# Patient Record
Sex: FEMALE | Race: WHITE | Employment: OTHER | URBAN - METROPOLITAN AREA
[De-identification: names, ages, dates, MRNs, and addresses within clinical notes are randomized per-mention and may not be internally consistent; named-entity substitution may affect disease eponyms.]

---

## 2017-05-11 ENCOUNTER — OFFICE VISIT (OUTPATIENT)
Dept: OBGYN CLINIC | Age: 32
End: 2017-05-11
Payer: COMMERCIAL

## 2017-05-11 VITALS
WEIGHT: 119.6 LBS | HEIGHT: 62 IN | BODY MASS INDEX: 22.01 KG/M2 | DIASTOLIC BLOOD PRESSURE: 58 MMHG | SYSTOLIC BLOOD PRESSURE: 111 MMHG | HEART RATE: 75 BPM

## 2017-05-11 DIAGNOSIS — N92.6 MISSED MENSES: Primary | ICD-10-CM

## 2017-05-11 LAB
CONTROL: POSITIVE
PREGNANCY TEST URINE, POC: POSITIVE

## 2017-05-11 PROCEDURE — G8420 CALC BMI NORM PARAMETERS: HCPCS | Performed by: ADVANCED PRACTICE MIDWIFE

## 2017-05-11 PROCEDURE — 99202 OFFICE O/P NEW SF 15 MIN: CPT | Performed by: ADVANCED PRACTICE MIDWIFE

## 2017-05-11 PROCEDURE — 1036F TOBACCO NON-USER: CPT | Performed by: ADVANCED PRACTICE MIDWIFE

## 2017-05-11 PROCEDURE — 81025 URINE PREGNANCY TEST: CPT | Performed by: ADVANCED PRACTICE MIDWIFE

## 2017-05-11 PROCEDURE — G8427 DOCREV CUR MEDS BY ELIG CLIN: HCPCS | Performed by: ADVANCED PRACTICE MIDWIFE

## 2017-05-16 ENCOUNTER — HOSPITAL ENCOUNTER (OUTPATIENT)
Dept: ULTRASOUND IMAGING | Age: 32
Discharge: HOME | End: 2017-05-16
Payer: COMMERCIAL

## 2017-05-16 DIAGNOSIS — N92.6 MISSED MENSES: ICD-10-CM

## 2017-05-16 PROCEDURE — 76801 OB US < 14 WKS SINGLE FETUS: CPT

## 2017-06-08 ENCOUNTER — INITIAL PRENATAL (OUTPATIENT)
Dept: OBGYN CLINIC | Age: 32
End: 2017-06-08

## 2017-06-08 ENCOUNTER — HOSPITAL ENCOUNTER (OUTPATIENT)
Age: 32
Setting detail: SPECIMEN
Discharge: HOME | End: 2017-06-08
Payer: COMMERCIAL

## 2017-06-08 VITALS
WEIGHT: 120 LBS | HEART RATE: 67 BPM | SYSTOLIC BLOOD PRESSURE: 105 MMHG | BODY MASS INDEX: 22.31 KG/M2 | DIASTOLIC BLOOD PRESSURE: 64 MMHG

## 2017-06-08 DIAGNOSIS — Z34.91 NORMAL PREGNANCY IN FIRST TRIMESTER: ICD-10-CM

## 2017-06-08 DIAGNOSIS — O22.41 HEMORRHOIDS DURING PREGNANCY IN FIRST TRIMESTER: ICD-10-CM

## 2017-06-08 DIAGNOSIS — F41.9 ANXIETY: ICD-10-CM

## 2017-06-08 LAB
-: ABNORMAL
ABO/RH: NORMAL
ABSOLUTE EOS #: 0.3 K/UL (ref 0–0.4)
ABSOLUTE LYMPH #: 1.2 K/UL (ref 1–4.8)
ABSOLUTE MONO #: 0.5 K/UL (ref 0.1–1.2)
AMORPHOUS: ABNORMAL
AMPHETAMINE SCREEN URINE: NEGATIVE
ANTIBODY SCREEN: NEGATIVE
BACTERIA: ABNORMAL
BARBITURATE SCREEN URINE: NEGATIVE
BASOPHILS # BLD: 0 %
BASOPHILS ABSOLUTE: 0 K/UL (ref 0–0.2)
BENZODIAZEPINE SCREEN, URINE: NEGATIVE
BILIRUBIN URINE: NEGATIVE
BUPRENORPHINE URINE: NORMAL
CANNABINOID SCREEN URINE: NEGATIVE
CASTS UA: ABNORMAL /LPF (ref 0–2)
COCAINE METABOLITE, URINE: NEGATIVE
COLOR: ABNORMAL
COMMENT UA: ABNORMAL
CRYSTALS, UA: ABNORMAL /HPF
DIFFERENTIAL TYPE: ABNORMAL
EOSINOPHILS RELATIVE PERCENT: 3 %
EPITHELIAL CELLS UA: ABNORMAL /HPF (ref 0–5)
GLUCOSE URINE: NEGATIVE
HCT VFR BLD CALC: 35.8 % (ref 36–46)
HEMOGLOBIN: 12.2 G/DL (ref 12–16)
HEPATITIS B SURFACE ANTIGEN: NONREACTIVE
HIV AG/AB: NONREACTIVE
KETONES, URINE: NEGATIVE
LEUKOCYTE ESTERASE, URINE: NEGATIVE
LYMPHOCYTES # BLD: 12 %
MCH RBC QN AUTO: 31.3 PG (ref 26–34)
MCHC RBC AUTO-ENTMCNC: 34.1 G/DL (ref 31–37)
MCV RBC AUTO: 91.8 FL (ref 80–100)
MDMA URINE: NORMAL
METHADONE SCREEN, URINE: NEGATIVE
METHAMPHETAMINE, URINE: NORMAL
MONOCYTES # BLD: 6 %
MUCUS: ABNORMAL
NITRITE, URINE: NEGATIVE
OPIATES, URINE: NEGATIVE
OTHER OBSERVATIONS UA: ABNORMAL
OXYCODONE SCREEN URINE: NEGATIVE
PDW BLD-RTO: 13.4 % (ref 12.5–15.4)
PH UA: 7.5 (ref 5–8)
PHENCYCLIDINE, URINE: NEGATIVE
PLATELET # BLD: 276 K/UL (ref 140–450)
PLATELET ESTIMATE: ABNORMAL
PMV BLD AUTO: 8.9 FL (ref 6–12)
PROPOXYPHENE, URINE: NORMAL
PROTEIN UA: NEGATIVE
RBC # BLD: 3.89 M/UL (ref 4–5.2)
RBC # BLD: ABNORMAL 10*6/UL
RBC UA: ABNORMAL /HPF (ref 0–2)
RENAL EPITHELIAL, UA: ABNORMAL /HPF
RUBV IGG SER QL: >500 IU/ML
SEG NEUTROPHILS: 79 %
SEGMENTED NEUTROPHILS ABSOLUTE COUNT: 7.6 K/UL (ref 1.8–7.7)
SPECIFIC GRAVITY UA: 1.02 (ref 1–1.03)
T. PALLIDUM, IGG: NONREACTIVE
TEST INFORMATION: NORMAL
TRICHOMONAS: ABNORMAL
TRICYCLIC ANTIDEPRESSANTS, UR: NORMAL
TURBIDITY: ABNORMAL
URINE HGB: NEGATIVE
UROBILINOGEN, URINE: NORMAL
WBC # BLD: 9.6 K/UL (ref 3.5–11)
WBC # BLD: ABNORMAL 10*3/UL
WBC UA: ABNORMAL /HPF (ref 0–5)
YEAST: ABNORMAL

## 2017-06-08 PROCEDURE — 0500F INITIAL PRENATAL CARE VISIT: CPT | Performed by: ADVANCED PRACTICE MIDWIFE

## 2017-06-09 LAB
C. TRACHOMATIS DNA ,URINE: NEGATIVE
CULTURE: NORMAL
CULTURE: NORMAL
Lab: NORMAL
N. GONORRHOEAE DNA, URINE: NEGATIVE
SPECIMEN DESCRIPTION: NORMAL
STATUS: NORMAL

## 2017-07-10 ENCOUNTER — ROUTINE PRENATAL (OUTPATIENT)
Dept: OBGYN CLINIC | Age: 32
End: 2017-07-10

## 2017-07-10 VITALS
WEIGHT: 125 LBS | SYSTOLIC BLOOD PRESSURE: 108 MMHG | BODY MASS INDEX: 23.24 KG/M2 | HEART RATE: 72 BPM | DIASTOLIC BLOOD PRESSURE: 67 MMHG

## 2017-07-10 DIAGNOSIS — Z3A.15 15 WEEKS GESTATION OF PREGNANCY: ICD-10-CM

## 2017-07-10 DIAGNOSIS — O34.42: ICD-10-CM

## 2017-07-10 DIAGNOSIS — Z34.90 NORMAL PREGNANCY, ANTEPARTUM: Primary | ICD-10-CM

## 2017-07-10 PROBLEM — Z34.91 NORMAL PREGNANCY IN FIRST TRIMESTER: Status: RESOLVED | Noted: 2017-06-08 | Resolved: 2017-07-10

## 2017-07-10 PROBLEM — N92.6 MISSED MENSES: Status: RESOLVED | Noted: 2017-05-11 | Resolved: 2017-07-10

## 2017-07-10 PROBLEM — O34.40 HISTORY OF LOOP ELECTROSURGICAL EXCISION PROCEDURE (LEEP) OF CERVIX AFFECTING PREGNANCY, ANTEPARTUM: Status: ACTIVE | Noted: 2017-07-10

## 2017-07-10 PROBLEM — Z98.890 HISTORY OF LOOP ELECTROSURGICAL EXCISION PROCEDURE (LEEP) OF CERVIX AFFECTING PREGNANCY, ANTEPARTUM: Status: ACTIVE | Noted: 2017-07-10

## 2017-07-10 PROCEDURE — 0502F SUBSEQUENT PRENATAL CARE: CPT | Performed by: ADVANCED PRACTICE MIDWIFE

## 2017-08-10 ENCOUNTER — ROUTINE PRENATAL (OUTPATIENT)
Dept: OBGYN CLINIC | Age: 32
End: 2017-08-10

## 2017-08-10 VITALS
DIASTOLIC BLOOD PRESSURE: 54 MMHG | BODY MASS INDEX: 24.7 KG/M2 | HEART RATE: 82 BPM | WEIGHT: 132.9 LBS | SYSTOLIC BLOOD PRESSURE: 112 MMHG

## 2017-08-10 DIAGNOSIS — Z3A.19 19 WEEKS GESTATION OF PREGNANCY: Primary | ICD-10-CM

## 2017-08-10 PROCEDURE — 1036F TOBACCO NON-USER: CPT | Performed by: ADVANCED PRACTICE MIDWIFE

## 2017-08-10 PROCEDURE — G8420 CALC BMI NORM PARAMETERS: HCPCS | Performed by: ADVANCED PRACTICE MIDWIFE

## 2017-08-10 PROCEDURE — 0502F SUBSEQUENT PRENATAL CARE: CPT | Performed by: ADVANCED PRACTICE MIDWIFE

## 2017-08-10 PROCEDURE — G8427 DOCREV CUR MEDS BY ELIG CLIN: HCPCS | Performed by: ADVANCED PRACTICE MIDWIFE

## 2017-08-17 ENCOUNTER — ROUTINE PRENATAL (OUTPATIENT)
Dept: PERINATAL CARE | Age: 32
End: 2017-08-17
Payer: COMMERCIAL

## 2017-08-17 VITALS
HEART RATE: 80 BPM | BODY MASS INDEX: 25.09 KG/M2 | TEMPERATURE: 98.2 F | WEIGHT: 135 LBS | SYSTOLIC BLOOD PRESSURE: 116 MMHG | DIASTOLIC BLOOD PRESSURE: 78 MMHG | RESPIRATION RATE: 20 BRPM

## 2017-08-17 DIAGNOSIS — Z3A.20 20 WEEKS GESTATION OF PREGNANCY: ICD-10-CM

## 2017-08-17 DIAGNOSIS — O34.42: Primary | ICD-10-CM

## 2017-08-17 PROCEDURE — 1036F TOBACCO NON-USER: CPT | Performed by: OBSTETRICS & GYNECOLOGY

## 2017-08-17 PROCEDURE — 76811 OB US DETAILED SNGL FETUS: CPT | Performed by: OBSTETRICS & GYNECOLOGY

## 2017-08-17 PROCEDURE — 76817 TRANSVAGINAL US OBSTETRIC: CPT | Performed by: OBSTETRICS & GYNECOLOGY

## 2017-08-31 ENCOUNTER — ROUTINE PRENATAL (OUTPATIENT)
Dept: PERINATAL CARE | Age: 32
End: 2017-08-31
Payer: COMMERCIAL

## 2017-08-31 VITALS
DIASTOLIC BLOOD PRESSURE: 53 MMHG | TEMPERATURE: 98.6 F | WEIGHT: 137 LBS | RESPIRATION RATE: 18 BRPM | SYSTOLIC BLOOD PRESSURE: 98 MMHG | BODY MASS INDEX: 25.47 KG/M2 | HEART RATE: 66 BPM

## 2017-08-31 DIAGNOSIS — O34.42: Primary | ICD-10-CM

## 2017-08-31 DIAGNOSIS — Z3A.22 22 WEEKS GESTATION OF PREGNANCY: ICD-10-CM

## 2017-08-31 PROCEDURE — 76817 TRANSVAGINAL US OBSTETRIC: CPT | Performed by: OBSTETRICS & GYNECOLOGY

## 2017-09-01 ENCOUNTER — PATIENT MESSAGE (OUTPATIENT)
Dept: OBGYN CLINIC | Age: 32
End: 2017-09-01

## 2017-09-14 ENCOUNTER — HOSPITAL ENCOUNTER (OUTPATIENT)
Age: 32
Setting detail: SPECIMEN
Discharge: HOME | End: 2017-09-14
Payer: COMMERCIAL

## 2017-09-14 ENCOUNTER — ROUTINE PRENATAL (OUTPATIENT)
Dept: OBGYN CLINIC | Age: 32
End: 2017-09-14

## 2017-09-14 ENCOUNTER — ROUTINE PRENATAL (OUTPATIENT)
Dept: PERINATAL CARE | Age: 32
End: 2017-09-14
Payer: COMMERCIAL

## 2017-09-14 VITALS
DIASTOLIC BLOOD PRESSURE: 66 MMHG | HEART RATE: 72 BPM | RESPIRATION RATE: 16 BRPM | HEIGHT: 62 IN | BODY MASS INDEX: 25.95 KG/M2 | TEMPERATURE: 98.3 F | WEIGHT: 141 LBS | SYSTOLIC BLOOD PRESSURE: 110 MMHG

## 2017-09-14 VITALS
DIASTOLIC BLOOD PRESSURE: 65 MMHG | HEART RATE: 70 BPM | SYSTOLIC BLOOD PRESSURE: 100 MMHG | WEIGHT: 141.9 LBS | BODY MASS INDEX: 26.38 KG/M2

## 2017-09-14 DIAGNOSIS — O34.42: Primary | ICD-10-CM

## 2017-09-14 DIAGNOSIS — Z3A.24 24 WEEKS GESTATION OF PREGNANCY: ICD-10-CM

## 2017-09-14 DIAGNOSIS — Z34.90 NORMAL PREGNANCY, ANTEPARTUM: Primary | Chronic | ICD-10-CM

## 2017-09-14 DIAGNOSIS — Z34.90 NORMAL PREGNANCY, ANTEPARTUM: Chronic | ICD-10-CM

## 2017-09-14 DIAGNOSIS — Z36.4 ANTENATAL SCREENING FOR FETAL GROWTH RETARDATION USING ULTRASONICS: ICD-10-CM

## 2017-09-14 LAB
GLUCOSE ADMINISTRATION: NORMAL
GLUCOSE TOLERANCE SCREEN 50G: 86 MG/DL (ref 70–135)
HCT VFR BLD CALC: 33.3 % (ref 36–46)
HEMOGLOBIN: 11.2 G/DL (ref 12–16)
MCH RBC QN AUTO: 31.6 PG (ref 26–34)
MCHC RBC AUTO-ENTMCNC: 33.7 G/DL (ref 31–37)
MCV RBC AUTO: 93.9 FL (ref 80–100)
PDW BLD-RTO: 13.8 % (ref 12.5–15.4)
PLATELET # BLD: 267 K/UL (ref 140–450)
PMV BLD AUTO: 7.8 FL (ref 6–12)
RBC # BLD: 3.55 M/UL (ref 4–5.2)
WBC # BLD: 9.3 K/UL (ref 3.5–11)

## 2017-09-14 PROCEDURE — 76816 OB US FOLLOW-UP PER FETUS: CPT | Performed by: OBSTETRICS & GYNECOLOGY

## 2017-09-14 PROCEDURE — 0502F SUBSEQUENT PRENATAL CARE: CPT | Performed by: ADVANCED PRACTICE MIDWIFE

## 2017-09-14 PROCEDURE — 1036F TOBACCO NON-USER: CPT | Performed by: ADVANCED PRACTICE MIDWIFE

## 2017-09-14 PROCEDURE — 76817 TRANSVAGINAL US OBSTETRIC: CPT | Performed by: OBSTETRICS & GYNECOLOGY

## 2017-09-14 PROCEDURE — 1036F TOBACCO NON-USER: CPT | Performed by: OBSTETRICS & GYNECOLOGY

## 2017-09-14 PROCEDURE — G8419 CALC BMI OUT NRM PARAM NOF/U: HCPCS | Performed by: ADVANCED PRACTICE MIDWIFE

## 2017-09-14 PROCEDURE — G8427 DOCREV CUR MEDS BY ELIG CLIN: HCPCS | Performed by: ADVANCED PRACTICE MIDWIFE

## 2017-10-09 ENCOUNTER — ROUTINE PRENATAL (OUTPATIENT)
Dept: OBGYN CLINIC | Age: 32
End: 2017-10-09

## 2017-10-09 VITALS
HEART RATE: 83 BPM | SYSTOLIC BLOOD PRESSURE: 105 MMHG | DIASTOLIC BLOOD PRESSURE: 68 MMHG | WEIGHT: 147.5 LBS | BODY MASS INDEX: 27.42 KG/M2

## 2017-10-09 DIAGNOSIS — Z3A.28 28 WEEKS GESTATION OF PREGNANCY: ICD-10-CM

## 2017-10-09 DIAGNOSIS — Z34.90 NORMAL PREGNANCY, ANTEPARTUM: Primary | Chronic | ICD-10-CM

## 2017-10-09 PROCEDURE — G8427 DOCREV CUR MEDS BY ELIG CLIN: HCPCS | Performed by: ADVANCED PRACTICE MIDWIFE

## 2017-10-09 PROCEDURE — 0502F SUBSEQUENT PRENATAL CARE: CPT | Performed by: ADVANCED PRACTICE MIDWIFE

## 2017-10-09 PROCEDURE — G8419 CALC BMI OUT NRM PARAM NOF/U: HCPCS | Performed by: ADVANCED PRACTICE MIDWIFE

## 2017-10-09 PROCEDURE — G8484 FLU IMMUNIZE NO ADMIN: HCPCS | Performed by: ADVANCED PRACTICE MIDWIFE

## 2017-10-09 PROCEDURE — 1036F TOBACCO NON-USER: CPT | Performed by: ADVANCED PRACTICE MIDWIFE

## 2017-10-09 NOTE — PROGRESS NOTES
SUBJECTIVE:  Tarah Betancur is here for her return OB visit. She reports fetal movement. She denies  vaginal bleeding. She denies  vaginal discharge. She denies leaking of fluid. She denies uterine contraction activity. She denies nausea and/or vomiting. She denies retaining fluid in her extremities. Arrives with . Reports is doing well. Is able to feel more movement. Is going to attend child birthing classes, is starting to become more concerned about after infant arrival with pediatrician and decisions such as vaccines. OBJECTIVE:  Blood pressure 105/68, pulse 83, weight 147 lb 8 oz (66.9 kg), last menstrual period 2017. Tarah Betancur has not received the flu vaccine as appropriate  Tarah Betancur has not received the Tdap vaccine as appropriate    She has not RhoGam as indicated    ASSESSMENT:  IUP @ 28.0 weeks  S = D    PLAN:  The problem list was reviewed and updated with any new issues from today's visit    Tarah Betancur will monitor fetal movement daily. Fetal Kick Count was discussed and explained. 28 week lab results were reviewed. Andrew-Gray contractions vs  labor contractions were reviewed. Signs and symptoms of Pre-Eclampsia were were reviewed and discussed  Will RTO in 3 weeks per request  Tarah Betancur was counseled regarding all of the above    Patient was seen with total face to face time of 15 minutes. More than 50% of this visit was for education and counseling.

## 2017-11-07 ENCOUNTER — ROUTINE PRENATAL (OUTPATIENT)
Dept: OBGYN CLINIC | Age: 32
End: 2017-11-07

## 2017-11-07 VITALS
BODY MASS INDEX: 28.63 KG/M2 | SYSTOLIC BLOOD PRESSURE: 112 MMHG | WEIGHT: 154 LBS | DIASTOLIC BLOOD PRESSURE: 60 MMHG | HEART RATE: 86 BPM

## 2017-11-07 DIAGNOSIS — Z3A.32 32 WEEKS GESTATION OF PREGNANCY: ICD-10-CM

## 2017-11-07 DIAGNOSIS — Z34.93 PRENATAL CARE IN THIRD TRIMESTER: Primary | ICD-10-CM

## 2017-11-07 PROCEDURE — 0502F SUBSEQUENT PRENATAL CARE: CPT | Performed by: ADVANCED PRACTICE MIDWIFE

## 2017-11-22 ENCOUNTER — ROUTINE PRENATAL (OUTPATIENT)
Dept: OBGYN CLINIC | Age: 32
End: 2017-11-22

## 2017-11-22 VITALS
BODY MASS INDEX: 29.56 KG/M2 | HEART RATE: 86 BPM | DIASTOLIC BLOOD PRESSURE: 64 MMHG | WEIGHT: 159 LBS | SYSTOLIC BLOOD PRESSURE: 111 MMHG

## 2017-11-22 DIAGNOSIS — Z3A.34 34 WEEKS GESTATION OF PREGNANCY: ICD-10-CM

## 2017-11-22 DIAGNOSIS — Z34.93 PRENATAL CARE IN THIRD TRIMESTER: Primary | ICD-10-CM

## 2017-11-22 PROCEDURE — 0502F SUBSEQUENT PRENATAL CARE: CPT | Performed by: ADVANCED PRACTICE MIDWIFE

## 2017-11-22 NOTE — PROGRESS NOTES
SUBJECTIVE:    Modesta Briones is here for her routine OB visit. She reports  fetal movement. She denies  vaginal bleeding. She denies  leaking of fluid. She denies  vaginal discharge. She denies  uterine contraction activity. She denies  nausea and/or vomiting. She denies retaining fluid in her extremities. OBJECTIVE:   Blood pressure 111/64, pulse 86, weight 159 lb (72.1 kg), last menstrual period 03/03/2017. SVE  deferred        ASSESSMENT:  IUP @ 34 weeks  S = D      PLAN:   Modesta Briones will monitor for fetal movements daily. The problem list was reviewed and updated as needed. GBS protocol and testing was discussed. GBS culture was not obtained. Results were not reviewed. Signs of labor to report were discussed. Birth preferences were discussed. Modesta Briones was not counseled regarding Post Partum Depression. She has not decided on contraceptive choice. Modesta Briones was counseled regarding labor signs. More than 50% of this 20 min visit was on counseling and education.

## 2017-12-07 ENCOUNTER — ROUTINE PRENATAL (OUTPATIENT)
Dept: OBGYN CLINIC | Age: 32
End: 2017-12-07

## 2017-12-07 ENCOUNTER — HOSPITAL ENCOUNTER (OUTPATIENT)
Age: 32
Setting detail: SPECIMEN
Discharge: HOME | End: 2017-12-07
Payer: COMMERCIAL

## 2017-12-07 VITALS
WEIGHT: 163.3 LBS | SYSTOLIC BLOOD PRESSURE: 112 MMHG | DIASTOLIC BLOOD PRESSURE: 68 MMHG | HEART RATE: 77 BPM | BODY MASS INDEX: 30.36 KG/M2

## 2017-12-07 DIAGNOSIS — Z34.90 NORMAL PREGNANCY, ANTEPARTUM: Chronic | ICD-10-CM

## 2017-12-07 DIAGNOSIS — Z34.90 NORMAL PREGNANCY, ANTEPARTUM: Primary | Chronic | ICD-10-CM

## 2017-12-07 PROCEDURE — 0502F SUBSEQUENT PRENATAL CARE: CPT | Performed by: ADVANCED PRACTICE MIDWIFE

## 2017-12-07 NOTE — PROGRESS NOTES
SUBJECTIVE:  Saran Bautista is here for her routine OB visit. She reports fetal movement. She denies vaginal bleeding. She denies leaking of fluid. She denies vaginal discharge. She denies uterine contraction activity. She denies nausea and/or vomiting. She denies retaining fluid in her extremities. Feeling good and is ready! Is seeing chiropractor. OBJECTIVE:   Blood pressure 112/68, pulse 77, weight 163 lb 4.8 oz (74.1 kg), last menstrual period 03/03/2017. ASSESSMENT:  IUP @ 36 3/7 weeks  S = D    PLAN:   The problem list was reviewed and updated as needed. Saran Bautista will monitor for fetal movements daily  GBS protocol and testing was discussed. GBS culture was obtained. Results were not reviewed. Signs of labor to report were discussed. Birth preferences were discussed. Post-dates testing and protocol were not discussed  Saran Bautista was not counseled regarding Post Partum Depression. She has not decided on contraceptive choice. Third trimester STI screen was not done. Saran Bautista was counseled regarding all of the above    Patient was seen with total faced to face time of 15 minutes. More than 50% of this visit was on counseling and education.

## 2017-12-10 LAB
CULTURE: NORMAL
CULTURE: NORMAL
Lab: NORMAL
SPECIMEN DESCRIPTION: NORMAL
STATUS: NORMAL

## 2017-12-14 ENCOUNTER — ROUTINE PRENATAL (OUTPATIENT)
Dept: OBGYN CLINIC | Age: 32
End: 2017-12-14

## 2017-12-14 VITALS
DIASTOLIC BLOOD PRESSURE: 71 MMHG | BODY MASS INDEX: 31.04 KG/M2 | WEIGHT: 167 LBS | SYSTOLIC BLOOD PRESSURE: 116 MMHG | HEART RATE: 101 BPM

## 2017-12-14 DIAGNOSIS — Z34.90 NORMAL PREGNANCY, ANTEPARTUM: Chronic | ICD-10-CM

## 2017-12-14 PROCEDURE — G8484 FLU IMMUNIZE NO ADMIN: HCPCS | Performed by: ADVANCED PRACTICE MIDWIFE

## 2017-12-14 PROCEDURE — G8419 CALC BMI OUT NRM PARAM NOF/U: HCPCS | Performed by: ADVANCED PRACTICE MIDWIFE

## 2017-12-14 PROCEDURE — 0502F SUBSEQUENT PRENATAL CARE: CPT | Performed by: ADVANCED PRACTICE MIDWIFE

## 2017-12-14 PROCEDURE — G8427 DOCREV CUR MEDS BY ELIG CLIN: HCPCS | Performed by: ADVANCED PRACTICE MIDWIFE

## 2017-12-14 PROCEDURE — 1036F TOBACCO NON-USER: CPT | Performed by: ADVANCED PRACTICE MIDWIFE

## 2017-12-14 NOTE — PROGRESS NOTES
SUBJECTIVE:    Chelsey Hammonds is here for her routine OB visit. She reports  fetal movement. She denies  vaginal bleeding. She denies  leaking of fluid. She denies  vaginal discharge. She none  uterine contraction activity. She denies  nausea and/or vomiting. She denies retaining fluid in her extremities. Been drinking lots of water this week but feels she could drinkl more. Seeing chior and no concerns voiced. Reviewed birthplans and questions answered. Very excited and confident regarding birth. OBJECTIVE:   Blood pressure 116/71, pulse 101, weight 167 lb (75.8 kg), last menstrual period 03/03/2017. SVE  deferred    Bishops Score      0        1         2         3        Patient  Dilation                clsd     1-2      3-4      5-6             Effacement           0-30   40-50  60-70  >80            Station                  -3        -2       -1/0     +1/+2         Consistency         Firm    Med     Soft                      Position                Post    Mid      Ant                                                                           Total score        deferred      ASSESSMENT:  IUP @ 37 weeks  S = D      PLAN:   Chelsey Hammonds will monitor for fetal movements daily. The problem list was reviewed and updated as needed. GBS protocol and testing was discussed. GBS culture was not obtained. Results were reviewed. Signs of labor to report were discussed. Birth preferences were discussed. Chelsey Hammonds was not counseled regarding Post Partum Depression. She has not decided on contraceptive choice. Chelsey Hammonds was counseled regarding GBS, birth plans and labor. More than 50% of this 20 min visit was on counseling and education.

## 2017-12-21 ENCOUNTER — ROUTINE PRENATAL (OUTPATIENT)
Dept: OBGYN CLINIC | Age: 32
End: 2017-12-21

## 2017-12-21 VITALS
DIASTOLIC BLOOD PRESSURE: 75 MMHG | SYSTOLIC BLOOD PRESSURE: 131 MMHG | WEIGHT: 167.5 LBS | HEART RATE: 85 BPM | BODY MASS INDEX: 31.14 KG/M2

## 2017-12-21 DIAGNOSIS — Z3A.38 38 WEEKS GESTATION OF PREGNANCY: Primary | ICD-10-CM

## 2017-12-21 PROCEDURE — 1036F TOBACCO NON-USER: CPT | Performed by: ADVANCED PRACTICE MIDWIFE

## 2017-12-21 PROCEDURE — G8427 DOCREV CUR MEDS BY ELIG CLIN: HCPCS | Performed by: ADVANCED PRACTICE MIDWIFE

## 2017-12-21 PROCEDURE — 0502F SUBSEQUENT PRENATAL CARE: CPT | Performed by: ADVANCED PRACTICE MIDWIFE

## 2017-12-21 PROCEDURE — G8419 CALC BMI OUT NRM PARAM NOF/U: HCPCS | Performed by: ADVANCED PRACTICE MIDWIFE

## 2017-12-21 PROCEDURE — G8484 FLU IMMUNIZE NO ADMIN: HCPCS | Performed by: ADVANCED PRACTICE MIDWIFE

## 2017-12-21 RX ORDER — BREAST PUMP
1 EACH MISCELLANEOUS PRN
Qty: 1 EACH | Refills: 0 | Status: SHIPPED | OUTPATIENT
Start: 2017-12-21 | End: 2020-03-06

## 2017-12-26 ENCOUNTER — HOSPITAL ENCOUNTER (INPATIENT)
Age: 32
LOS: 2 days | Discharge: HOME | End: 2017-12-29
Attending: OBSTETRICS & GYNECOLOGY | Admitting: OBSTETRICS & GYNECOLOGY
Payer: COMMERCIAL

## 2017-12-26 PROBLEM — O09.90 HIGH RISK PREGNANCY, ANTEPARTUM: Status: ACTIVE | Noted: 2017-12-26

## 2017-12-26 RX ORDER — ONDANSETRON 2 MG/ML
4 INJECTION INTRAMUSCULAR; INTRAVENOUS EVERY 6 HOURS PRN
Status: DISCONTINUED | OUTPATIENT
Start: 2017-12-26 | End: 2017-12-27

## 2017-12-26 RX ORDER — ACETAMINOPHEN 500 MG
1000 TABLET ORAL EVERY 6 HOURS PRN
Status: DISCONTINUED | OUTPATIENT
Start: 2017-12-26 | End: 2017-12-27

## 2017-12-27 ENCOUNTER — ANESTHESIA (OUTPATIENT)
Dept: LABOR AND DELIVERY | Age: 32
End: 2017-12-27
Payer: COMMERCIAL

## 2017-12-27 ENCOUNTER — ANESTHESIA EVENT (OUTPATIENT)
Dept: LABOR AND DELIVERY | Age: 32
End: 2017-12-27
Payer: COMMERCIAL

## 2017-12-27 PROBLEM — O09.90 HIGH RISK PREGNANCY, ANTEPARTUM: Status: RESOLVED | Noted: 2017-12-26 | Resolved: 2017-12-27

## 2017-12-27 LAB
-: ABNORMAL
-: NORMAL
ABO/RH: NORMAL
ABSOLUTE EOS #: 0.07 K/UL (ref 0–0.44)
ABSOLUTE IMMATURE GRANULOCYTE: 0.13 K/UL (ref 0–0.3)
ABSOLUTE LYMPH #: 1.14 K/UL (ref 1.1–3.7)
ABSOLUTE MONO #: 0.74 K/UL (ref 0.1–1.2)
AMORPHOUS: ABNORMAL
AMORPHOUS: NORMAL
AMPHETAMINE SCREEN URINE: NEGATIVE
ANTIBODY SCREEN: NEGATIVE
ARM BAND NUMBER: NORMAL
BACTERIA: ABNORMAL
BACTERIA: NORMAL
BARBITURATE SCREEN URINE: NEGATIVE
BASOPHILS # BLD: 0 % (ref 0–2)
BASOPHILS ABSOLUTE: 0.03 K/UL (ref 0–0.2)
BENZODIAZEPINE SCREEN, URINE: NEGATIVE
BILIRUBIN URINE: NEGATIVE
BILIRUBIN URINE: NEGATIVE
BUPRENORPHINE URINE: NORMAL
CANNABINOID SCREEN URINE: NEGATIVE
CASTS UA: ABNORMAL /LPF (ref 0–8)
CASTS UA: NORMAL /LPF (ref 0–2)
COCAINE METABOLITE, URINE: NEGATIVE
COLOR: ABNORMAL
COLOR: YELLOW
COMMENT UA: ABNORMAL
COMMENT UA: ABNORMAL
CRYSTALS, UA: ABNORMAL /HPF
CRYSTALS, UA: NORMAL /HPF
DIFFERENTIAL TYPE: ABNORMAL
EOSINOPHILS RELATIVE PERCENT: 0 % (ref 1–4)
EPITHELIAL CELLS UA: ABNORMAL /HPF (ref 0–5)
EPITHELIAL CELLS UA: NORMAL /HPF (ref 0–5)
EXPIRATION DATE: NORMAL
GLUCOSE URINE: NEGATIVE
GLUCOSE URINE: NEGATIVE
HCT VFR BLD CALC: 40.8 % (ref 36.3–47.1)
HEMOGLOBIN: 13.8 G/DL (ref 11.9–15.1)
IMMATURE GRANULOCYTES: 1 %
KETONES, URINE: NEGATIVE
KETONES, URINE: NEGATIVE
LEUKOCYTE ESTERASE, URINE: ABNORMAL
LEUKOCYTE ESTERASE, URINE: NEGATIVE
LYMPHOCYTES # BLD: 6 % (ref 24–43)
MCH RBC QN AUTO: 31.4 PG (ref 25.2–33.5)
MCHC RBC AUTO-ENTMCNC: 33.8 G/DL (ref 28.4–34.8)
MCV RBC AUTO: 92.7 FL (ref 82.6–102.9)
MDMA URINE: NORMAL
METHADONE SCREEN, URINE: NEGATIVE
METHAMPHETAMINE, URINE: NORMAL
MONOCYTES # BLD: 4 % (ref 3–12)
MUCUS: ABNORMAL
MUCUS: NORMAL
NITRITE, URINE: NEGATIVE
NITRITE, URINE: NEGATIVE
OPIATES, URINE: NEGATIVE
OTHER OBSERVATIONS UA: ABNORMAL
OTHER OBSERVATIONS UA: NORMAL
OXYCODONE SCREEN URINE: NEGATIVE
PDW BLD-RTO: 13 % (ref 11.8–14.4)
PH UA: 6 (ref 5–8)
PH UA: 6 (ref 5–8)
PHENCYCLIDINE, URINE: NEGATIVE
PLATELET # BLD: 268 K/UL (ref 138–453)
PLATELET ESTIMATE: ABNORMAL
PMV BLD AUTO: 10.2 FL (ref 8.1–13.5)
PROPOXYPHENE, URINE: NORMAL
PROTEIN UA: ABNORMAL
PROTEIN UA: NEGATIVE
RBC # BLD: 4.4 M/UL (ref 3.95–5.11)
RBC # BLD: ABNORMAL 10*6/UL
RBC UA: ABNORMAL /HPF (ref 0–4)
RBC UA: NORMAL /HPF (ref 0–2)
RENAL EPITHELIAL, UA: ABNORMAL /HPF
RENAL EPITHELIAL, UA: NORMAL /HPF
SEG NEUTROPHILS: 89 % (ref 36–65)
SEGMENTED NEUTROPHILS ABSOLUTE COUNT: 16.23 K/UL (ref 1.5–8.1)
SPECIFIC GRAVITY UA: 1.01 (ref 1–1.03)
SPECIFIC GRAVITY UA: 1.02 (ref 1–1.03)
TEST INFORMATION: NORMAL
TRICHOMONAS: ABNORMAL
TRICHOMONAS: NORMAL
TRICYCLIC ANTIDEPRESSANTS, UR: NORMAL
TURBIDITY: ABNORMAL
TURBIDITY: CLEAR
URINE HGB: ABNORMAL
URINE HGB: ABNORMAL
UROBILINOGEN, URINE: NORMAL
UROBILINOGEN, URINE: NORMAL
WBC # BLD: 18.3 K/UL (ref 3.5–11.3)
WBC # BLD: ABNORMAL 10*3/UL
WBC UA: ABNORMAL /HPF (ref 0–5)
WBC UA: NORMAL /HPF (ref 0–5)
YEAST: ABNORMAL
YEAST: NORMAL

## 2017-12-27 PROCEDURE — 2500000003 HC RX 250 WO HCPCS: Performed by: NURSE ANESTHETIST, CERTIFIED REGISTERED

## 2017-12-27 PROCEDURE — 3700000025 ANESTHESIA EPIDURAL BLOCK: Performed by: ANESTHESIOLOGY

## 2017-12-27 PROCEDURE — 1220000000 HC SEMI PRIVATE OB R&B

## 2017-12-27 PROCEDURE — 7200000001 HC VAGINAL DELIVERY

## 2017-12-27 PROCEDURE — 0KQM0ZZ REPAIR PERINEUM MUSCLE, OPEN APPROACH: ICD-10-PCS | Performed by: ADVANCED PRACTICE MIDWIFE

## 2017-12-27 PROCEDURE — 81001 URINALYSIS AUTO W/SCOPE: CPT

## 2017-12-27 PROCEDURE — 6360000002 HC RX W HCPCS: Performed by: ADVANCED PRACTICE MIDWIFE

## 2017-12-27 PROCEDURE — 80307 DRUG TEST PRSMV CHEM ANLYZR: CPT

## 2017-12-27 PROCEDURE — 2500000003 HC RX 250 WO HCPCS

## 2017-12-27 PROCEDURE — 86901 BLOOD TYPING SEROLOGIC RH(D): CPT

## 2017-12-27 PROCEDURE — 6360000002 HC RX W HCPCS

## 2017-12-27 PROCEDURE — 87086 URINE CULTURE/COLONY COUNT: CPT

## 2017-12-27 PROCEDURE — 85025 COMPLETE CBC W/AUTO DIFF WBC: CPT

## 2017-12-27 PROCEDURE — 6360000002 HC RX W HCPCS: Performed by: NURSE ANESTHETIST, CERTIFIED REGISTERED

## 2017-12-27 PROCEDURE — 88307 TISSUE EXAM BY PATHOLOGIST: CPT

## 2017-12-27 PROCEDURE — 6370000000 HC RX 637 (ALT 250 FOR IP): Performed by: STUDENT IN AN ORGANIZED HEALTH CARE EDUCATION/TRAINING PROGRAM

## 2017-12-27 PROCEDURE — 86850 RBC ANTIBODY SCREEN: CPT

## 2017-12-27 PROCEDURE — 86900 BLOOD TYPING SEROLOGIC ABO: CPT

## 2017-12-27 RX ORDER — ROPIVACAINE HYDROCHLORIDE 2 MG/ML
INJECTION, SOLUTION EPIDURAL; INFILTRATION; PERINEURAL
Status: COMPLETED
Start: 2017-12-27 | End: 2017-12-27

## 2017-12-27 RX ORDER — ACETAMINOPHEN 500 MG
1000 TABLET ORAL EVERY 6 HOURS PRN
Status: DISCONTINUED | OUTPATIENT
Start: 2017-12-27 | End: 2017-12-27 | Stop reason: SDUPTHER

## 2017-12-27 RX ORDER — SODIUM CHLORIDE, SODIUM LACTATE, POTASSIUM CHLORIDE, CALCIUM CHLORIDE 600; 310; 30; 20 MG/100ML; MG/100ML; MG/100ML; MG/100ML
INJECTION, SOLUTION INTRAVENOUS CONTINUOUS
Status: DISCONTINUED | OUTPATIENT
Start: 2017-12-27 | End: 2017-12-27

## 2017-12-27 RX ORDER — BISACODYL 10 MG
10 SUPPOSITORY, RECTAL RECTAL DAILY PRN
Status: DISCONTINUED | OUTPATIENT
Start: 2017-12-27 | End: 2017-12-29 | Stop reason: HOSPADM

## 2017-12-27 RX ORDER — LIDOCAINE HYDROCHLORIDE 10 MG/ML
INJECTION, SOLUTION INFILTRATION; PERINEURAL PRN
Status: DISCONTINUED | OUTPATIENT
Start: 2017-12-27 | End: 2017-12-28 | Stop reason: SDUPTHER

## 2017-12-27 RX ORDER — IBUPROFEN 800 MG/1
800 TABLET ORAL EVERY 8 HOURS PRN
Status: DISCONTINUED | OUTPATIENT
Start: 2017-12-27 | End: 2017-12-29 | Stop reason: HOSPADM

## 2017-12-27 RX ORDER — ONDANSETRON 4 MG/1
4 TABLET, FILM COATED ORAL EVERY 6 HOURS PRN
Status: DISCONTINUED | OUTPATIENT
Start: 2017-12-27 | End: 2017-12-29 | Stop reason: HOSPADM

## 2017-12-27 RX ORDER — ACETAMINOPHEN 500 MG
1000 TABLET ORAL EVERY 6 HOURS PRN
Status: DISCONTINUED | OUTPATIENT
Start: 2017-12-27 | End: 2017-12-29 | Stop reason: HOSPADM

## 2017-12-27 RX ORDER — SIMETHICONE 80 MG
80 TABLET,CHEWABLE ORAL EVERY 6 HOURS PRN
Status: DISCONTINUED | OUTPATIENT
Start: 2017-12-27 | End: 2017-12-29 | Stop reason: HOSPADM

## 2017-12-27 RX ORDER — LIDOCAINE HYDROCHLORIDE AND EPINEPHRINE 15; 5 MG/ML; UG/ML
INJECTION, SOLUTION EPIDURAL PRN
Status: DISCONTINUED | OUTPATIENT
Start: 2017-12-27 | End: 2017-12-28 | Stop reason: SDUPTHER

## 2017-12-27 RX ORDER — NALOXONE HYDROCHLORIDE 0.4 MG/ML
0.4 INJECTION, SOLUTION INTRAMUSCULAR; INTRAVENOUS; SUBCUTANEOUS PRN
Status: DISCONTINUED | OUTPATIENT
Start: 2017-12-27 | End: 2017-12-27

## 2017-12-27 RX ORDER — LIDOCAINE HYDROCHLORIDE 10 MG/ML
INJECTION, SOLUTION INFILTRATION; PERINEURAL
Status: DISCONTINUED
Start: 2017-12-27 | End: 2017-12-27

## 2017-12-27 RX ORDER — DOCUSATE SODIUM 100 MG/1
100 CAPSULE, LIQUID FILLED ORAL 2 TIMES DAILY
Status: DISCONTINUED | OUTPATIENT
Start: 2017-12-27 | End: 2017-12-29 | Stop reason: HOSPADM

## 2017-12-27 RX ORDER — ROPIVACAINE HYDROCHLORIDE 2 MG/ML
INJECTION, SOLUTION EPIDURAL; INFILTRATION; PERINEURAL PRN
Status: DISCONTINUED | OUTPATIENT
Start: 2017-12-27 | End: 2017-12-28 | Stop reason: SDUPTHER

## 2017-12-27 RX ORDER — SODIUM CHLORIDE 0.9 % (FLUSH) 0.9 %
10 SYRINGE (ML) INJECTION EVERY 12 HOURS SCHEDULED
Status: DISCONTINUED | OUTPATIENT
Start: 2017-12-27 | End: 2017-12-27

## 2017-12-27 RX ORDER — SODIUM CHLORIDE 0.9 % (FLUSH) 0.9 %
10 SYRINGE (ML) INJECTION PRN
Status: DISCONTINUED | OUTPATIENT
Start: 2017-12-27 | End: 2017-12-27

## 2017-12-27 RX ORDER — NALBUPHINE HCL 10 MG/ML
10 AMPUL (ML) INJECTION
Status: DISCONTINUED | OUTPATIENT
Start: 2017-12-27 | End: 2017-12-27

## 2017-12-27 RX ORDER — CEPHALEXIN 500 MG/1
500 CAPSULE ORAL 4 TIMES DAILY
Status: DISCONTINUED | OUTPATIENT
Start: 2017-12-28 | End: 2017-12-29 | Stop reason: HOSPADM

## 2017-12-27 RX ORDER — ONDANSETRON 2 MG/ML
4 INJECTION INTRAMUSCULAR; INTRAVENOUS EVERY 6 HOURS PRN
Status: DISCONTINUED | OUTPATIENT
Start: 2017-12-27 | End: 2017-12-27

## 2017-12-27 RX ORDER — ROPIVACAINE HYDROCHLORIDE 2 MG/ML
INJECTION, SOLUTION EPIDURAL; INFILTRATION; PERINEURAL CONTINUOUS PRN
Status: DISCONTINUED | OUTPATIENT
Start: 2017-12-27 | End: 2017-12-28 | Stop reason: SDUPTHER

## 2017-12-27 RX ORDER — LANOLIN 100 %
OINTMENT (GRAM) TOPICAL PRN
Status: DISCONTINUED | OUTPATIENT
Start: 2017-12-27 | End: 2017-12-29 | Stop reason: HOSPADM

## 2017-12-27 RX ORDER — PHENAZOPYRIDINE HYDROCHLORIDE 100 MG/1
200 TABLET, FILM COATED ORAL
Status: DISCONTINUED | OUTPATIENT
Start: 2017-12-28 | End: 2017-12-29 | Stop reason: HOSPADM

## 2017-12-27 RX ORDER — LIDOCAINE HYDROCHLORIDE 10 MG/ML
20 INJECTION, SOLUTION INFILTRATION; PERINEURAL ONCE
Status: DISCONTINUED | OUTPATIENT
Start: 2017-12-27 | End: 2017-12-27

## 2017-12-27 RX ORDER — DIPHENHYDRAMINE HCL 25 MG
25 TABLET ORAL EVERY 6 HOURS PRN
Status: DISCONTINUED | OUTPATIENT
Start: 2017-12-27 | End: 2017-12-29 | Stop reason: HOSPADM

## 2017-12-27 RX ORDER — CALCIUM CARBONATE 200(500)MG
1000 TABLET,CHEWABLE ORAL 3 TIMES DAILY PRN
Status: DISCONTINUED | OUTPATIENT
Start: 2017-12-27 | End: 2017-12-29 | Stop reason: HOSPADM

## 2017-12-27 RX ADMIN — NALBUPHINE HYDROCHLORIDE 10 MG: 10 INJECTION, SOLUTION INTRAMUSCULAR; INTRAVENOUS; SUBCUTANEOUS at 03:08

## 2017-12-27 RX ADMIN — IBUPROFEN 800 MG: 800 TABLET, FILM COATED ORAL at 17:52

## 2017-12-27 RX ADMIN — ROPIVACAINE HYDROCHLORIDE 6 ML/HR: 2 INJECTION, SOLUTION EPIDURAL; INFILTRATION at 09:44

## 2017-12-27 RX ADMIN — ROPIVACAINE HYDROCHLORIDE 8 ML: 2 INJECTION, SOLUTION EPIDURAL; INFILTRATION at 09:44

## 2017-12-27 RX ADMIN — LIDOCAINE HYDROCHLORIDE 20 ML: 10 INJECTION, SOLUTION INFILTRATION; PERINEURAL at 17:00

## 2017-12-27 RX ADMIN — Medication 20 MG: at 09:37

## 2017-12-27 RX ADMIN — LIDOCAINE HYDROCHLORIDE,EPINEPHRINE BITARTRATE 3 ML: 15; .005 INJECTION, SOLUTION EPIDURAL; INFILTRATION; INTRACAUDAL; PERINEURAL at 09:39

## 2017-12-27 ASSESSMENT — PAIN SCALES - GENERAL
PAINLEVEL_OUTOF10: 6
PAINLEVEL_OUTOF10: 10
PAINLEVEL_OUTOF10: 4

## 2017-12-27 NOTE — L&D DELIVERY SUMMARY NOTE
[id number] [facility] Vaginal Delivery Note    Labor & Delivery Summary  Dilation Complete Date: 17  Dilation Complete Time: 1156    Pre-operative Diagnosis:  Term pregnancy     Post-operative Diagnosis:  Same, delivered    Procedure:  Spontaneous vaginal delivery    Provider:       Information for the patient's :  Tonia Bernardo [[id number]]          Anesthesia:  epidural anesthesia    Estimated blood loss:  200 ml    Specimen:  Placenta not sent to pathology     Cord blood sent Yes    Complications:  none    Condition:  infant stable to general nursery    Details of Procedure: The patient is a 28 y.o. female at 44w2d   OB History      Para Term  AB Living    2       1      SAB TAB Ectopic Molar Multiple Live Births    1                 who was admitted for early latent labor. She received the following interventions: ARBOW. The patient progressed ,did receive an epidural, became complete and started to push. After pushing for 2 hours and 20 minutes the fetal head was at the perineum and the rest of the infant delivered atraumatically with nuchal cord x 1, easily reduced, and shoulders easily delivered and placed on mother abdomen. The cord was clamped and cut and cord blood obtained. The delivery of the placenta was spontaneous. Placenta examination appears intact with a 3 vessel cord. Uterus contracted immediately and bleeding was under control. Cord insertion was central. The perineum and vagina were inspected and a second degree laceration was found. The laceration was well approximated anatomically, not actively bleeding and was repaired by Dr. Jesse Dumont and Dr. Zak Bui. Rectal exam revealed no palpable sutures and no defects. Pitocin was given immediately after delivery of placenta. Mother and baby stable.  Baby girl to breast.

## 2017-12-27 NOTE — H&P
Noted    High risk pregnancy, antepartum 12/26/2017    Normal pregnancy, antepartum 07/10/2017     BIRTHPLANS: Natural preference but open if necessary; rooming in; breastfeedng; circ if boy, will do Vit K, no to [facility]; delayed bath at least 24 hours, OK to wash hair if needed; residents are fine.  History of loop electrosurgical excision procedure (LEEP) of cervix affecting pregnancy, antepartum 07/10/2017     Cervical US til 24 weeks:      Anxiety 06/08/2017    Hemorrhoids during pregnancy in first trimester 06/08/2017       Plan discussed with Lucille Chavarria CNM, who is agreeable. Steroids given this admission: No    Risks, benefits, alternatives and possible complications have been discussed in detail with the patient. Admission, and post admission procedures and expectations were discussed in detail. All questions were answered.     Attending's Name: Dr. Jarod Son DO  Ob/Gyn Resident  12/26/2017, 10:15 PM

## 2017-12-27 NOTE — L&D DELIVERY NOTE
Genie Mccartney RN    Rodriguez Right Windschitl, DO                 Cord    Vessels:  3 Vessels   Complications:  Nuchal   Nuchal Intervention:  reduced   Nuchal Cord Description:  loose nuchal cord   Number of Loops:  1   Delayed Cord Clamping?:  Yes   Cord Clamped Date/Time:  2017 1558   Cord Blood Disposition:  Lab   Gases Sent?:  Yes   Stem Cell Collection (by MD):  No          Placenta    Date/time:  2017 1603   Removal:  Spontaneous   Appearance:  Intact   Disposition:  Lab, Pathology          Delivery Resuscitation    Method:  Bulb Suction, Stimulation          Apgars    Living status:  Living   Apgars    1 Minute:   5 Minute:   10 Minute 15 Minute 20 Minute   Skin Color: 1  1       Heart Rate: 2  2       Reflex Irritability: 2  2       Muscle Tone: 2  2       Respiratory Effort: 2  2       Total: 9  9                  Apgars Assigned By:  Luz Marina Lema          Skin to Skin    Skin to skin initiation date/time: 17   Skin to skin with:   Mother   Skin to skin end date/time:            Delivery Information    Episiotomy:  None   Perineal lacerations:  2nd Repaired:  Yes   Vaginal laceration:  No    Cervical laceration:  No    Surgical or additional est. blood loss (mL):  0 (View Only):  Edit in Flowsheets   Combined est. blood loss (mL):  0   Repair suture:  None          Vaginal Delivery Counts    Initial count personnel:  MARBIN MCPHERSON   Initial count verified by:  CHEN ROSS    4x4:   Needles:   Instruments:   Lap Pads:   Sponges:     Initial counts:          Final counts:             Final count personnel:  Radha Spivey   Final count verified by:  DR FREEDMAN   Accurate final count?:  Yes          Other Procedures    Procedures:  None                  Vaginal Delivery Note  Department of Obstetrics and Gynecology  [facility]       Patient: Jennifer Hernandez   : 1985  MRN: [id number]   Date of delivery: 17     Pre-operative Diagnosis:   Jennifer Hernandez  at sponge counts correct  Blood Type and Rh: A POSITIVE    Rubella Immunity Status: immune  Infant Feeding: breast feeding    Damian Wells DO  Ob/Gyn Resident  12/27/2017, 4:41 PM

## 2017-12-27 NOTE — FLOWSHEET NOTE
Pt transferred to [facility] room 735 via w/c with baby in arms accompanied by family with belongings.

## 2017-12-27 NOTE — FLOWSHEET NOTE
Pt presents to labor and delivery with complaints of contractions with her  at her side. Pt requests tub room. Pt to room 708 to change into gown and void.

## 2017-12-27 NOTE — FLOWSHEET NOTE
EFM removed. RN assists patient to the tub. Pt  remains at her side, offering support. Pt oriented to call lights, and instructed to call out with any needs.

## 2017-12-28 PROBLEM — O09.93 HRP (HIGH RISK PREGNANCY), THIRD TRIMESTER: Status: ACTIVE | Noted: 2017-12-28

## 2017-12-28 LAB
HCT VFR BLD CALC: 32.8 % (ref 36.3–47.1)
HEMOGLOBIN: 10.9 G/DL (ref 11.9–15.1)

## 2017-12-28 PROCEDURE — 1220000000 HC SEMI PRIVATE OB R&B

## 2017-12-28 PROCEDURE — 6370000000 HC RX 637 (ALT 250 FOR IP): Performed by: STUDENT IN AN ORGANIZED HEALTH CARE EDUCATION/TRAINING PROGRAM

## 2017-12-28 PROCEDURE — 85018 HEMOGLOBIN: CPT

## 2017-12-28 PROCEDURE — 85014 HEMATOCRIT: CPT

## 2017-12-28 PROCEDURE — 36415 COLL VENOUS BLD VENIPUNCTURE: CPT

## 2017-12-28 RX ORDER — PSEUDOEPHEDRINE HCL 30 MG
100 TABLET ORAL 2 TIMES DAILY PRN
Qty: 60 CAPSULE | Refills: 0 | Status: SHIPPED | OUTPATIENT
Start: 2017-12-28 | End: 2018-02-13

## 2017-12-28 RX ORDER — CEPHALEXIN 500 MG/1
500 CAPSULE ORAL 4 TIMES DAILY
Qty: 36 CAPSULE | Refills: 0 | Status: SHIPPED | OUTPATIENT
Start: 2017-12-28 | End: 2018-01-06

## 2017-12-28 RX ORDER — IBUPROFEN 800 MG/1
800 TABLET ORAL EVERY 8 HOURS PRN
Qty: 30 TABLET | Refills: 0 | Status: SHIPPED | OUTPATIENT
Start: 2017-12-28 | End: 2018-02-13

## 2017-12-28 RX ORDER — PHENAZOPYRIDINE HYDROCHLORIDE 200 MG/1
200 TABLET, FILM COATED ORAL 3 TIMES DAILY PRN
Qty: 12 TABLET | Refills: 0 | Status: SHIPPED | OUTPATIENT
Start: 2017-12-28 | End: 2017-12-31

## 2017-12-28 RX ADMIN — IBUPROFEN 800 MG: 800 TABLET, FILM COATED ORAL at 18:44

## 2017-12-28 RX ADMIN — PHENAZOPYRIDINE HYDROCHLORIDE 200 MG: 100 TABLET ORAL at 12:25

## 2017-12-28 RX ADMIN — CEPHALEXIN 500 MG: 500 CAPSULE ORAL at 09:10

## 2017-12-28 RX ADMIN — CEPHALEXIN 500 MG: 500 CAPSULE ORAL at 17:44

## 2017-12-28 RX ADMIN — DOCUSATE SODIUM 100 MG: 100 CAPSULE ORAL at 09:10

## 2017-12-28 RX ADMIN — PHENAZOPYRIDINE HYDROCHLORIDE 200 MG: 100 TABLET ORAL at 00:14

## 2017-12-28 RX ADMIN — WITCH HAZEL 40 EACH: 500 SOLUTION RECTAL; TOPICAL at 09:10

## 2017-12-28 RX ADMIN — CEPHALEXIN 500 MG: 500 CAPSULE ORAL at 00:14

## 2017-12-28 RX ADMIN — IBUPROFEN 800 MG: 800 TABLET, FILM COATED ORAL at 03:00

## 2017-12-28 RX ADMIN — PHENAZOPYRIDINE HYDROCHLORIDE 200 MG: 100 TABLET ORAL at 17:44

## 2017-12-28 RX ADMIN — IBUPROFEN 800 MG: 800 TABLET, FILM COATED ORAL at 10:52

## 2017-12-28 RX ADMIN — CEPHALEXIN 500 MG: 500 CAPSULE ORAL at 14:04

## 2017-12-28 RX ADMIN — DOCUSATE SODIUM 100 MG: 100 CAPSULE ORAL at 20:05

## 2017-12-28 RX ADMIN — CEPHALEXIN 500 MG: 500 CAPSULE ORAL at 20:05

## 2017-12-28 RX ADMIN — DOCUSATE SODIUM 100 MG: 100 CAPSULE ORAL at 00:13

## 2017-12-28 RX ADMIN — PHENAZOPYRIDINE HYDROCHLORIDE 200 MG: 100 TABLET ORAL at 09:10

## 2017-12-28 ASSESSMENT — PAIN DESCRIPTION - LOCATION
LOCATION: ABDOMEN
LOCATION: ABDOMEN

## 2017-12-28 ASSESSMENT — PAIN DESCRIPTION - PAIN TYPE
TYPE: ACUTE PAIN
TYPE: ACUTE PAIN

## 2017-12-28 ASSESSMENT — PAIN DESCRIPTION - DESCRIPTORS
DESCRIPTORS: CRAMPING
DESCRIPTORS: CRAMPING

## 2017-12-28 ASSESSMENT — PAIN SCALES - GENERAL
PAINLEVEL_OUTOF10: 1
PAINLEVEL_OUTOF10: 1
PAINLEVEL_OUTOF10: 4

## 2017-12-28 NOTE — ANESTHESIA POSTPROCEDURE EVALUATION
Department of Anesthesiology  Postprocedure Note    Patient: Phillip Arredondo  MRN: [id number]  [place]: 1985  Date of evaluation: 12/28/2017  Time:  6:33 AM     Procedure Summary     Date:  12/27/17 Room / Location:      Anesthesia Start:  0930 Anesthesia Stop:  6171    Procedure:  ANESTHESIA LABOR ANALGESIA Diagnosis:      Scheduled Providers:   Responsible Provider:  Jv Day MD    Anesthesia Type:  epidural ASA Status:  2          Anesthesia Type: No value filed. Corrine Phase I:      Corrine Phase II:      Last vitals: Reviewed and per EMR flowsheets.        Anesthesia Post Evaluation    Patient location during evaluation: floor  Patient participation: complete - patient participated  Level of consciousness: awake and alert  Pain score: 0  Airway patency: patent  Nausea & Vomiting: no nausea and no vomiting  Complications: no  Cardiovascular status: blood pressure returned to baseline  Respiratory status: acceptable  Hydration status: euvolemic

## 2017-12-28 NOTE — PLAN OF CARE
Problem: Pain:  Goal: Pain level will decrease  Pain level will decrease   Outcome: Ongoing    Goal: Control of acute pain  Control of acute pain   Outcome: Ongoing      Problem: Fluid Volume - Imbalance:  Goal: Absence of imbalanced fluid volume signs and symptoms  Absence of imbalanced fluid volume signs and symptoms   Outcome: Ongoing    Goal: Absence of postpartum hemorrhage signs and symptoms  Absence of postpartum hemorrhage signs and symptoms   Outcome: Ongoing      Problem: Infection - Risk of, Puerperal Infection:  Goal: Will show no infection signs and symptoms  Will show no infection signs and symptoms   Outcome: Ongoing

## 2017-12-29 VITALS
OXYGEN SATURATION: 99 % | TEMPERATURE: 99.1 F | SYSTOLIC BLOOD PRESSURE: 123 MMHG | RESPIRATION RATE: 16 BRPM | HEIGHT: 62 IN | HEART RATE: 86 BPM | BODY MASS INDEX: 30.73 KG/M2 | DIASTOLIC BLOOD PRESSURE: 74 MMHG | WEIGHT: 167 LBS

## 2017-12-29 LAB
CULTURE: NORMAL
CULTURE: NORMAL
Lab: NORMAL
SPECIMEN DESCRIPTION: NORMAL
STATUS: NORMAL
SURGICAL PATHOLOGY REPORT: NORMAL

## 2017-12-29 PROCEDURE — 6370000000 HC RX 637 (ALT 250 FOR IP): Performed by: STUDENT IN AN ORGANIZED HEALTH CARE EDUCATION/TRAINING PROGRAM

## 2017-12-29 PROCEDURE — 90715 TDAP VACCINE 7 YRS/> IM: CPT | Performed by: STUDENT IN AN ORGANIZED HEALTH CARE EDUCATION/TRAINING PROGRAM

## 2017-12-29 PROCEDURE — 90471 IMMUNIZATION ADMIN: CPT | Performed by: STUDENT IN AN ORGANIZED HEALTH CARE EDUCATION/TRAINING PROGRAM

## 2017-12-29 PROCEDURE — S9443 LACTATION CLASS: HCPCS

## 2017-12-29 PROCEDURE — 6360000002 HC RX W HCPCS: Performed by: STUDENT IN AN ORGANIZED HEALTH CARE EDUCATION/TRAINING PROGRAM

## 2017-12-29 RX ADMIN — IBUPROFEN 800 MG: 800 TABLET, FILM COATED ORAL at 03:00

## 2017-12-29 RX ADMIN — TETANUS TOXOID, REDUCED DIPHTHERIA TOXOID AND ACELLULAR PERTUSSIS VACCINE, ADSORBED 0.5 ML: 5; 2.5; 8; 8; 2.5 SUSPENSION INTRAMUSCULAR at 19:27

## 2017-12-29 RX ADMIN — DOCUSATE SODIUM 100 MG: 100 CAPSULE ORAL at 10:02

## 2017-12-29 RX ADMIN — CEPHALEXIN 500 MG: 500 CAPSULE ORAL at 17:12

## 2017-12-29 RX ADMIN — PHENAZOPYRIDINE HYDROCHLORIDE 200 MG: 100 TABLET ORAL at 17:12

## 2017-12-29 RX ADMIN — PHENAZOPYRIDINE HYDROCHLORIDE 200 MG: 100 TABLET ORAL at 14:23

## 2017-12-29 RX ADMIN — CEPHALEXIN 500 MG: 500 CAPSULE ORAL at 14:23

## 2017-12-29 RX ADMIN — IBUPROFEN 800 MG: 800 TABLET, FILM COATED ORAL at 14:23

## 2017-12-29 RX ADMIN — CEPHALEXIN 500 MG: 500 CAPSULE ORAL at 10:02

## 2017-12-29 RX ADMIN — PHENAZOPYRIDINE HYDROCHLORIDE 200 MG: 100 TABLET ORAL at 10:01

## 2017-12-29 ASSESSMENT — PAIN SCALES - GENERAL
PAINLEVEL_OUTOF10: 1
PAINLEVEL_OUTOF10: 5

## 2017-12-29 NOTE — PROGRESS NOTES
[facility] Labor Note    Subjective: Krystal Stone is now very comfortable and has been able to sleep for a couple of hours. Is in good spirits. ON SVE was found to be 9 cm with a bulging bag. Discussed AROM and Krystal Stone is in agreement. AROM for moderate amount of clear fluids. Vertex to 10 and +1. Objective:     VS:  height is 5' 1.5\" (1.562 m) and weight is 167 lb (75.8 kg). Her oral temperature is 98.1 °F (36.7 °C). Her blood pressure is 103/50 (abnormal) and her pulse is 95. Her respiration is 16 and oxygen saturation is 99%. FHT:    Baseline: 115   Variability: moderate              Accels: present   Decels: Absent     Intermittent ausculation: Baseline:                                                       Rhythm:                                                             Accels from baseline present:                                                      Decels from baseline present:    Scalp electrode: No    Contraction pattern:                                  Frequency: 2-7                                 Duration: 60 sec                                 Intensity: firm to palpation                                 Pitocin: 0                                 IUPC:  No    Cervix:             Dilation: 10            Effacement: 100            Station: +1            Position: mid            Consistency: soft    Status of membranes: AROM for moderate amount of clear fluids    Pain control: Epidural    Maternal status (hydration, fatigue, voids): Hydrating with IV fluids, now rested. No void since epidural less than 2 hours.       Assessment:  Approaching second stage  FHR Category 1  Coping well with labor    Plan:  Labor down  Continue support
[facility] Labor Note    Subjective:    Patient is working well with labor. Jason Hernandez is working very well with contractions, but is fatiguing. Did not get any sleep last night, but states that the Nubain did help her relax. All pain management options again discussed and Jason Hernandez opts for an epidural. All questions answered.  at bedside and helpful. Objective:     VS:  height is 5' 1.5\" (1.562 m) and weight is 167 lb (75.8 kg). Her oral temperature is 98.7 °F (37.1 °C). Her blood pressure is 122/68 and her pulse is 101. Her respiration is 15. FHT:    Intermittent ausculation: Baseline: 120                                                        Accels from baseline present:  Yes                                                     Decels from baseline present: Variables    Scalp electrode: No    Contraction pattern:                                  Frequency: 2-6                                 Duration: 60-90                                 Intensity: firm to palpation                                 Pitocin: 0                                 IUPC:  No    Cervix:             Dilation: 5            Effacement: 100            Station: 0            Position: mid            Consistency: soft    Status of membranes: Intact    Pain control: Nothing at this time, but requesting an epidural    Maternal status (hydration, fatigue, voids): Dehydrated, bolus running; patient very fatigued and emotional, voiding per self     Assessment:  Early labor  Adequate prigress  FHR Category 1  Coping well with labor    Plan:  Epidural  Continue support
NNP and NICU charge in room to talk to parents regarding baby transfer to NICU at this time.
Encourage ambulation   - Postpartum Hgb/Hct completed  2. Rh positive/Rubella immune  3. Breast feeding    -Denies s/s of mastitis   4. UTI   -Keflex 500mg 4x daily for 10 days   -Afebrile    -Ucx pending   5. Continue post partum care  6. Desires discharge later today     Counseling Completed:  Secondary Smoke risks and Sudden Infant Death Syndrome were reviewed with recommendations. Infant sleeping, \"back to sleep\" and avoidance of co-sleeping recommendations were reviewed. Signs and Symptoms of Post Partum Depression were reviewed. The patient is to call if any occur. Signs and symptoms of Mastitis were reviewed. The patient is to call if any occur for follow up.   Discharge instructions including pelvic rest, no driving with pain medicine and office follow-up were reviewed with patient     Provider's Name: Krista Cain DO  Ob/Gyn Resident   12/28/2017, 6:37 AM
NONE    Trichomonas, UA NOT REPORTED NONE    Amorphous, UA NOT REPORTED NONE    Other Observations UA NOT REPORTED NREQ    Yeast, UA NOT REPORTED NONE   TYPE AND SCREEN   Result Value Ref Range    Expiration Date 12/30/2017     Arm Band Number [id number]     ABO/Rh A POSITIVE     Antibody Screen NEGATIVE      Assessment and Plan:     Suspected UTI   - Will start Keflex 500 mg QID x10 days    - Pyridium 200 mg TID    - Discussed with Sr. Resident     Eneida Muller DO  OB/GYN Resident, PGY1  [facility]   12/28/2017 12:10 AM

## 2018-01-18 ENCOUNTER — POSTPARTUM VISIT (OUTPATIENT)
Dept: OBGYN CLINIC | Age: 33
End: 2018-01-18

## 2018-01-18 VITALS — WEIGHT: 145.5 LBS | HEIGHT: 62 IN | BODY MASS INDEX: 26.78 KG/M2

## 2018-01-18 PROBLEM — O09.93 HRP (HIGH RISK PREGNANCY), THIRD TRIMESTER: Status: RESOLVED | Noted: 2017-12-28 | Resolved: 2018-01-18

## 2018-01-18 PROBLEM — Z34.90 NORMAL PREGNANCY, ANTEPARTUM: Chronic | Status: RESOLVED | Noted: 2017-07-10 | Resolved: 2018-01-18

## 2018-01-18 PROCEDURE — 99024 POSTOP FOLLOW-UP VISIT: CPT | Performed by: ADVANCED PRACTICE MIDWIFE

## 2018-02-13 ENCOUNTER — ROUTINE PRENATAL (OUTPATIENT)
Dept: OBGYN CLINIC | Age: 33
End: 2018-02-13

## 2018-02-13 VITALS
BODY MASS INDEX: 26.95 KG/M2 | DIASTOLIC BLOOD PRESSURE: 82 MMHG | HEART RATE: 61 BPM | WEIGHT: 145 LBS | SYSTOLIC BLOOD PRESSURE: 128 MMHG

## 2018-02-13 PROCEDURE — 0503F POSTPARTUM CARE VISIT: CPT | Performed by: ADVANCED PRACTICE MIDWIFE

## 2018-02-13 NOTE — PROGRESS NOTES
Chief Complaint   Patient presents with    Postpartum Care     HPI:  DELIVERY DATE: 12/27/17  TYPE OF DELIVERY: normal spontaneous vaginal delivery  PROVIDER: Chandrika Frazier CNM  PERINEUM: Second degree, repaired    Lise Arriola was seen for her 6 week visit. Her Female infant is healthy. She is breastfeeding and supplementing with formula. She is not experiencing pain. She reports her lochia is no bleeding  She reports none perineal discomfort. She denies urinary incontinence. Her bowels have returned to her normal pattern. She is back to her normal activity pattern. She has not her first menses. Lise Arriola has not engaged in intercourse. She does not report a mood disorder. She feels she is not having difficulty coping. Lise Arriola feels her family adjustment is effective. She reports an overall positive birth experience. Her [place] Score is 2. This score does match my assessment. OBJECTIVE:   Blood pressure 128/82, pulse 61, weight 145 lb (65.8 kg), last menstrual period 03/03/2017, currently breastfeeding. Breast exam: Not inspected    Abdomen: Soft non-tender without guarding. There is not diastasis present. The diastasis is 0 finger breaths of separation. Perineum: not inspected    Extremities: No calf tenderness bilaterally. No edema. ASSESSMENT:  6 week postpartum visit  Breast feeding  Family planning needs    PLAN:   1. She will return for her annual.  2. Labs were not ordered. 3. Signs & Symptoms of mastitis reviewed; notify if occurs  4. Secondary smoke risks were reviewed, including increased risks of respiratory problems, Sudden Infant Death Syndrome, and potential malignancies. 5. Family planning counseling was completed. Patient was seen with total face to face time of 15 minutes.   More than 50% of this visit was counseling and education regarding her Post Partum visit

## 2018-03-16 ENCOUNTER — OFFICE VISIT (OUTPATIENT)
Dept: OBGYN CLINIC | Age: 33
End: 2018-03-16
Payer: COMMERCIAL

## 2018-03-16 ENCOUNTER — HOSPITAL ENCOUNTER (OUTPATIENT)
Age: 33
Setting detail: SPECIMEN
Discharge: HOME | End: 2018-03-16
Payer: COMMERCIAL

## 2018-03-16 VITALS
HEIGHT: 62 IN | SYSTOLIC BLOOD PRESSURE: 114 MMHG | WEIGHT: 143.5 LBS | BODY MASS INDEX: 26.41 KG/M2 | DIASTOLIC BLOOD PRESSURE: 72 MMHG | HEART RATE: 67 BPM

## 2018-03-16 DIAGNOSIS — Z01.419 ENCOUNTER FOR WELL WOMAN EXAM WITH ROUTINE GYNECOLOGICAL EXAM: ICD-10-CM

## 2018-03-16 PROCEDURE — 99395 PREV VISIT EST AGE 18-39: CPT | Performed by: ADVANCED PRACTICE MIDWIFE
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
## 2018-03-27 LAB — CYTOLOGY REPORT: NORMAL

## 2018-04-15 PROBLEM — Z01.419 ENCOUNTER FOR WELL WOMAN EXAM WITH ROUTINE GYNECOLOGICAL EXAM: Status: RESOLVED | Noted: 2018-03-16 | Resolved: 2018-04-15

## 2019-07-22 ENCOUNTER — HOSPITAL ENCOUNTER (OUTPATIENT)
Age: 34
Setting detail: SPECIMEN
Discharge: HOME | End: 2019-07-22
Payer: COMMERCIAL

## 2019-07-22 ENCOUNTER — OFFICE VISIT (OUTPATIENT)
Dept: OBGYN CLINIC | Age: 34
End: 2019-07-22
Payer: COMMERCIAL

## 2019-07-22 VITALS
HEART RATE: 64 BPM | SYSTOLIC BLOOD PRESSURE: 111 MMHG | BODY MASS INDEX: 22.23 KG/M2 | DIASTOLIC BLOOD PRESSURE: 69 MMHG | WEIGHT: 120.8 LBS | HEIGHT: 62 IN

## 2019-07-22 DIAGNOSIS — N89.8 VAGINAL DISCHARGE: ICD-10-CM

## 2019-07-22 DIAGNOSIS — Z01.419 WELL WOMAN EXAM: ICD-10-CM

## 2019-07-22 DIAGNOSIS — Z01.419 WOMEN'S ANNUAL ROUTINE GYNECOLOGICAL EXAMINATION: ICD-10-CM

## 2019-07-22 DIAGNOSIS — N89.8 VAGINAL DISCHARGE: Primary | ICD-10-CM

## 2019-07-22 DIAGNOSIS — R68.82 DIMINISHED LIBIDO: ICD-10-CM

## 2019-07-22 LAB
DIRECT EXAM: ABNORMAL
Lab: ABNORMAL
SPECIMEN DESCRIPTION: ABNORMAL

## 2019-07-22 PROCEDURE — 99395 PREV VISIT EST AGE 18-39: CPT | Performed by: ADVANCED PRACTICE MIDWIFE

## 2019-07-22 NOTE — PROGRESS NOTES
screening completed: no   History of gestational diabetes: no    Weight management: no  Diet review: no    Gynecological history:  OB History    Para Term  AB Living   2 1 1   1 1   SAB TAB Ectopic Molar Multiple Live Births   1       0 1      # Outcome Date GA Lbr Misbah/2nd Weight Sex Delivery Anes PTL Lv   2 Term 17 39w2d 04:56 / 04:01 6 lb 2.8 oz (2.8 kg) F Vag-Spont EPI N YASMANY   1 SAB 2015               Menarche age: teen       Current Cycle regular yes now, days 5-7, flow moderate   Patient's last menstrual period was 2019 (approximate). Menopause age: no                    Patient's medications, allergies, past medical, surgical, social and family histories were reviewed and updated as appropriate. Hormone Replacement: no    STD history: no      Review of Systems:  Constitutional: No fever, chills or malaise. No unexplained weight changes. Head and eyes: No headache, dizziness or trauma. No visual changes. Wears eyewear no  ENT: No hearing loss, tinnitus, sinus or taste problems. Hematologic: No lymphedema, Von Willebrands's, hemophilia or bruising. Psychological: No depression, suicidal thoughts, crying or anxiety. Breast: No skin changes, masses, mastalgia or discharge. Respiratory: No SOB, coughing or wheezing. Cardiovascular: No chest pain with exertion, palpitations, syncope or edema. Gastrointestinal: No heartburn, nausea or vomiting, bloody stools. No concerning change in bowel pattern. Genitourinary: No dysuria, hematuria, dyspareunia or abnormal bleeding  Musculoskeletal: No arthralgia, gout, or muscle weakness. Neurologic: No CVA, migraines, seizures, syncope or numbness. Dermatologic:  No rashes, itching or hives  Lymphatic:  No adneopathy    Objective:   /69 (Site: Left Upper Arm, Position: Sitting, Cuff Size: Medium Adult)   Pulse 64   Ht 5' 2\" (1.575 m)   Wt 120 lb 12.8 oz (54.8 kg)   LMP 2019 (Approximate)   Breastfeeding?  No   BMI

## 2019-07-24 ENCOUNTER — TELEPHONE (OUTPATIENT)
Dept: OBGYN CLINIC | Age: 34
End: 2019-07-24

## 2019-07-24 DIAGNOSIS — N76.0 BACTERIAL VAGINITIS: Primary | ICD-10-CM

## 2019-07-24 DIAGNOSIS — B96.89 BACTERIAL VAGINITIS: Primary | ICD-10-CM

## 2019-07-24 RX ORDER — METRONIDAZOLE 500 MG/1
500 TABLET ORAL 2 TIMES DAILY
Qty: 14 TABLET | Refills: 0 | Status: SHIPPED | OUTPATIENT
Start: 2019-07-24 | End: 2019-09-16 | Stop reason: SDUPTHER

## 2019-07-25 LAB
CYTOLOGY REPORT: NORMAL
HPV SAMPLE: NORMAL
HPV, GENOTYPE 16: NOT DETECTED
HPV, GENOTYPE 18: NOT DETECTED
HPV, HIGH RISK OTHER: NOT DETECTED
HPV, INTERPRETATION: NORMAL
SPECIMEN DESCRIPTION: NORMAL

## 2019-08-17 ENCOUNTER — HOSPITAL ENCOUNTER (OUTPATIENT)
Age: 34
Discharge: HOME | End: 2019-08-17
Payer: COMMERCIAL

## 2019-08-17 DIAGNOSIS — Z01.419 WOMEN'S ANNUAL ROUTINE GYNECOLOGICAL EXAMINATION: ICD-10-CM

## 2019-08-17 LAB
ABSOLUTE EOS #: 0.26 K/UL (ref 0–0.44)
ABSOLUTE IMMATURE GRANULOCYTE: <0.03 K/UL (ref 0–0.3)
ABSOLUTE LYMPH #: 1.71 K/UL (ref 1.1–3.7)
ABSOLUTE MONO #: 0.52 K/UL (ref 0.1–1.2)
BASOPHILS # BLD: 1 % (ref 0–2)
BASOPHILS ABSOLUTE: 0.06 K/UL (ref 0–0.2)
CORTISOL COLLECTION INFO: NORMAL
CORTISOL: 16.5 UG/DL (ref 2.7–18.4)
DIFFERENTIAL TYPE: ABNORMAL
EOSINOPHILS RELATIVE PERCENT: 5 % (ref 1–4)
ESTRADIOL LEVEL: 160 PG/ML (ref 27–314)
FOLLICLE STIMULATING HORMONE: 3.2 U/L (ref 1.7–21.5)
HCT VFR BLD CALC: 40.3 % (ref 36.3–47.1)
HEMOGLOBIN: 12.8 G/DL (ref 11.9–15.1)
IMMATURE GRANULOCYTES: 0 %
LH: 11.3 U/L (ref 1–95.6)
LYMPHOCYTES # BLD: 32 % (ref 24–43)
MCH RBC QN AUTO: 30.9 PG (ref 25.2–33.5)
MCHC RBC AUTO-ENTMCNC: 31.8 G/DL (ref 28.4–34.8)
MCV RBC AUTO: 97.3 FL (ref 82.6–102.9)
MONOCYTES # BLD: 10 % (ref 3–12)
NRBC AUTOMATED: 0 PER 100 WBC
PDW BLD-RTO: 12.8 % (ref 11.8–14.4)
PLATELET # BLD: 250 K/UL (ref 138–453)
PLATELET ESTIMATE: ABNORMAL
PMV BLD AUTO: 10.7 FL (ref 8.1–13.5)
PROGESTERONE LEVEL: 7.66 NG/ML
RBC # BLD: 4.14 M/UL (ref 3.95–5.11)
RBC # BLD: ABNORMAL 10*6/UL
SEG NEUTROPHILS: 52 % (ref 36–65)
SEGMENTED NEUTROPHILS ABSOLUTE COUNT: 2.81 K/UL (ref 1.5–8.1)
TESTOSTERONE TOTAL: 22 NG/DL (ref 20–70)
TSH SERPL DL<=0.05 MIU/L-ACNC: 2.75 MIU/L (ref 0.3–5)
WBC # BLD: 5.4 K/UL (ref 3.5–11.3)
WBC # BLD: ABNORMAL 10*3/UL

## 2019-08-17 PROCEDURE — 83001 ASSAY OF GONADOTROPIN (FSH): CPT

## 2019-08-17 PROCEDURE — 82627 DEHYDROEPIANDROSTERONE: CPT

## 2019-08-17 PROCEDURE — 84144 ASSAY OF PROGESTERONE: CPT

## 2019-08-17 PROCEDURE — 85025 COMPLETE CBC W/AUTO DIFF WBC: CPT

## 2019-08-17 PROCEDURE — 82533 TOTAL CORTISOL: CPT

## 2019-08-17 PROCEDURE — 83002 ASSAY OF GONADOTROPIN (LH): CPT

## 2019-08-17 PROCEDURE — 84270 ASSAY OF SEX HORMONE GLOBUL: CPT

## 2019-08-17 PROCEDURE — 82670 ASSAY OF TOTAL ESTRADIOL: CPT

## 2019-08-17 PROCEDURE — 84443 ASSAY THYROID STIM HORMONE: CPT

## 2019-08-17 PROCEDURE — 36415 COLL VENOUS BLD VENIPUNCTURE: CPT

## 2019-08-17 PROCEDURE — 84403 ASSAY OF TOTAL TESTOSTERONE: CPT

## 2019-08-19 LAB
DHEAS (DHEA SULFATE): 72.5 UG/DL (ref 45–270)
SEX HORMONE BINDING GLOBULIN: 85 NMOL/L (ref 30–135)
TESTOSTERONE FREE-NONMALE: 2.1 PG/ML (ref 1.3–9.2)
TESTOSTERONE TOTAL: 23 NG/DL (ref 20–70)

## 2019-09-16 DIAGNOSIS — N76.0 BACTERIAL VAGINITIS: ICD-10-CM

## 2019-09-16 DIAGNOSIS — B96.89 BACTERIAL VAGINITIS: ICD-10-CM

## 2019-09-17 RX ORDER — METRONIDAZOLE 500 MG/1
TABLET ORAL
Qty: 14 TABLET | Refills: 0 | Status: SHIPPED | OUTPATIENT
Start: 2019-09-17 | End: 2019-12-05 | Stop reason: SDUPTHER

## 2019-09-19 ENCOUNTER — TELEPHONE (OUTPATIENT)
Dept: OBGYN CLINIC | Age: 34
End: 2019-09-19

## 2019-09-19 NOTE — TELEPHONE ENCOUNTER
Spoke with Mayela Lacy and just wanted to confirm prescription as she did not call for it  However, is now becoming symptomatic and going OOT, so will  prescription  Also, had question on lower Progesterone in Luteal phase and will start with OTC and go from there

## 2019-12-04 ENCOUNTER — HOSPITAL ENCOUNTER (OUTPATIENT)
Age: 34
Setting detail: SPECIMEN
Discharge: HOME | End: 2019-12-04
Payer: COMMERCIAL

## 2019-12-04 ENCOUNTER — OFFICE VISIT (OUTPATIENT)
Dept: OBGYN CLINIC | Age: 34
End: 2019-12-04
Payer: COMMERCIAL

## 2019-12-04 VITALS
BODY MASS INDEX: 22.45 KG/M2 | WEIGHT: 122 LBS | DIASTOLIC BLOOD PRESSURE: 81 MMHG | SYSTOLIC BLOOD PRESSURE: 127 MMHG | HEART RATE: 71 BPM | HEIGHT: 62 IN

## 2019-12-04 DIAGNOSIS — N89.8 VAGINAL ITCHING: Primary | ICD-10-CM

## 2019-12-04 LAB
DIRECT EXAM: ABNORMAL
Lab: ABNORMAL
SPECIMEN DESCRIPTION: ABNORMAL

## 2019-12-04 PROCEDURE — 99213 OFFICE O/P EST LOW 20 MIN: CPT | Performed by: ADVANCED PRACTICE MIDWIFE

## 2019-12-04 PROCEDURE — G8420 CALC BMI NORM PARAMETERS: HCPCS | Performed by: ADVANCED PRACTICE MIDWIFE

## 2019-12-04 PROCEDURE — G8427 DOCREV CUR MEDS BY ELIG CLIN: HCPCS | Performed by: ADVANCED PRACTICE MIDWIFE

## 2019-12-04 PROCEDURE — G8484 FLU IMMUNIZE NO ADMIN: HCPCS | Performed by: ADVANCED PRACTICE MIDWIFE

## 2019-12-04 PROCEDURE — 1036F TOBACCO NON-USER: CPT | Performed by: ADVANCED PRACTICE MIDWIFE

## 2019-12-05 DIAGNOSIS — N76.0 BV (BACTERIAL VAGINOSIS): Primary | ICD-10-CM

## 2019-12-05 DIAGNOSIS — B96.89 BV (BACTERIAL VAGINOSIS): Primary | ICD-10-CM

## 2019-12-05 RX ORDER — METRONIDAZOLE 500 MG/1
500 TABLET ORAL 2 TIMES DAILY
Qty: 14 TABLET | Refills: 0 | Status: SHIPPED | OUTPATIENT
Start: 2019-12-05 | End: 2019-12-12

## 2019-12-10 PROBLEM — N89.8 VAGINAL ITCHING: Status: ACTIVE | Noted: 2019-12-10

## 2020-01-29 ENCOUNTER — HOSPITAL ENCOUNTER (OUTPATIENT)
Age: 35
Setting detail: SPECIMEN
Discharge: HOME | End: 2020-01-29
Payer: COMMERCIAL

## 2020-01-29 ENCOUNTER — OFFICE VISIT (OUTPATIENT)
Dept: OBGYN CLINIC | Age: 35
End: 2020-01-29
Payer: COMMERCIAL

## 2020-01-29 VITALS
HEIGHT: 62 IN | WEIGHT: 117 LBS | BODY MASS INDEX: 21.53 KG/M2 | DIASTOLIC BLOOD PRESSURE: 76 MMHG | SYSTOLIC BLOOD PRESSURE: 104 MMHG

## 2020-01-29 PROBLEM — R11.0 NAUSEA: Status: ACTIVE | Noted: 2020-01-29

## 2020-01-29 PROBLEM — N89.8 VAGINAL DISCHARGE: Status: ACTIVE | Noted: 2020-01-29

## 2020-01-29 LAB
CONTROL: PRESENT
PREGNANCY TEST URINE, POC: NEGATIVE

## 2020-01-29 PROCEDURE — 81025 URINE PREGNANCY TEST: CPT | Performed by: ADVANCED PRACTICE MIDWIFE

## 2020-01-29 PROCEDURE — G8420 CALC BMI NORM PARAMETERS: HCPCS | Performed by: ADVANCED PRACTICE MIDWIFE

## 2020-01-29 PROCEDURE — G8484 FLU IMMUNIZE NO ADMIN: HCPCS | Performed by: ADVANCED PRACTICE MIDWIFE

## 2020-01-29 PROCEDURE — 99213 OFFICE O/P EST LOW 20 MIN: CPT | Performed by: ADVANCED PRACTICE MIDWIFE

## 2020-01-29 PROCEDURE — G8427 DOCREV CUR MEDS BY ELIG CLIN: HCPCS | Performed by: ADVANCED PRACTICE MIDWIFE

## 2020-01-29 PROCEDURE — 1036F TOBACCO NON-USER: CPT | Performed by: ADVANCED PRACTICE MIDWIFE

## 2020-01-30 LAB
DIRECT EXAM: NORMAL
Lab: NORMAL
SPECIMEN DESCRIPTION: NORMAL

## 2020-03-03 PROBLEM — N89.8 VAGINAL DISCHARGE: Status: RESOLVED | Noted: 2020-01-29 | Resolved: 2020-03-03

## 2020-03-03 PROBLEM — R11.0 NAUSEA: Status: RESOLVED | Noted: 2020-01-29 | Resolved: 2020-03-03

## 2020-03-03 PROBLEM — N89.8 VAGINAL ITCHING: Status: RESOLVED | Noted: 2019-12-10 | Resolved: 2020-03-03

## 2020-03-06 ENCOUNTER — OFFICE VISIT (OUTPATIENT)
Dept: FAMILY MEDICINE CLINIC | Age: 35
End: 2020-03-06
Payer: COMMERCIAL

## 2020-03-06 VITALS
BODY MASS INDEX: 21.79 KG/M2 | WEIGHT: 118.4 LBS | HEART RATE: 56 BPM | OXYGEN SATURATION: 99 % | TEMPERATURE: 96.9 F | SYSTOLIC BLOOD PRESSURE: 106 MMHG | DIASTOLIC BLOOD PRESSURE: 68 MMHG | HEIGHT: 62 IN

## 2020-03-06 PROCEDURE — G8484 FLU IMMUNIZE NO ADMIN: HCPCS | Performed by: FAMILY MEDICINE

## 2020-03-06 PROCEDURE — 99385 PREV VISIT NEW AGE 18-39: CPT | Performed by: FAMILY MEDICINE

## 2020-03-06 ASSESSMENT — ENCOUNTER SYMPTOMS
SHORTNESS OF BREATH: 0
CONSTIPATION: 0
EYE PAIN: 0
BLOOD IN STOOL: 0
NAUSEA: 0
ABDOMINAL PAIN: 0
VOMITING: 0

## 2020-03-06 ASSESSMENT — PATIENT HEALTH QUESTIONNAIRE - PHQ9
1. LITTLE INTEREST OR PLEASURE IN DOING THINGS: 0
SUM OF ALL RESPONSES TO PHQ9 QUESTIONS 1 & 2: 0
SUM OF ALL RESPONSES TO PHQ QUESTIONS 1-9: 0
2. FEELING DOWN, DEPRESSED OR HOPELESS: 0
SUM OF ALL RESPONSES TO PHQ QUESTIONS 1-9: 0

## 2020-03-06 NOTE — PROGRESS NOTES
change, diaphoresis, fever and unexpected weight change. HENT: Negative for ear pain. Eyes: Negative for pain. Respiratory: Negative for shortness of breath. Cardiovascular: Negative for chest pain. Gastrointestinal: Negative for abdominal pain, blood in stool, constipation, nausea and vomiting. Musculoskeletal: Negative for gait problem. Skin: Negative for pallor. Neurological: Negative for seizures. Psychiatric/Behavioral: Negative for dysphoric mood and suicidal ideas. Objective:     /68   Pulse 56   Temp 96.9 °F (36.1 °C) (Oral)   Ht 5' 2\" (1.575 m)   Wt 118 lb 6.4 oz (53.7 kg)   SpO2 99%   BMI 21.66 kg/m²     Physical Exam  Constitutional:       General: She is not in acute distress. Appearance: Normal appearance. She is well-developed. She is not ill-appearing or diaphoretic. HENT:      Head: Normocephalic. Eyes:      Conjunctiva/sclera: Conjunctivae normal.   Neck:      Musculoskeletal: Normal range of motion and neck supple. Thyroid: No thyromegaly. Cardiovascular:      Rate and Rhythm: Normal rate and regular rhythm. Heart sounds: Normal heart sounds. No murmur. Pulmonary:      Effort: Pulmonary effort is normal. No respiratory distress. Breath sounds: Normal breath sounds. No wheezing. Abdominal:      Palpations: Abdomen is soft. Tenderness: There is no guarding or rebound. Genitourinary:     Comments:  exam declined  Musculoskeletal:         General: No deformity. Lymphadenopathy:      Cervical: No cervical adenopathy. Skin:     General: Skin is warm. Findings: No rash. Neurological:      Mental Status: She is alert. Mental status is at baseline. Psychiatric:         Mood and Affect: Mood normal.         Behavior: Behavior normal.         Thought Content: Thought content normal.         Judgment: Judgment normal.         Assessment & Plan:      1. Establishing care with new doctor, encounter for    2.  Routine general

## 2021-09-14 ENCOUNTER — HOSPITAL ENCOUNTER (EMERGENCY)
Age: 36
Discharge: HOME | End: 2021-09-14
Attending: EMERGENCY MEDICINE
Payer: COMMERCIAL

## 2021-09-14 ENCOUNTER — APPOINTMENT (OUTPATIENT)
Dept: CT IMAGING | Age: 36
End: 2021-09-14
Payer: COMMERCIAL

## 2021-09-14 ENCOUNTER — APPOINTMENT (OUTPATIENT)
Dept: GENERAL RADIOLOGY | Age: 36
End: 2021-09-14
Payer: COMMERCIAL

## 2021-09-14 VITALS
TEMPERATURE: 98.7 F | OXYGEN SATURATION: 99 % | RESPIRATION RATE: 15 BRPM | WEIGHT: 120 LBS | SYSTOLIC BLOOD PRESSURE: 120 MMHG | BODY MASS INDEX: 22.08 KG/M2 | HEIGHT: 62 IN | DIASTOLIC BLOOD PRESSURE: 59 MMHG | HEART RATE: 73 BPM

## 2021-09-14 DIAGNOSIS — S09.90XA INJURY OF HEAD, INITIAL ENCOUNTER: ICD-10-CM

## 2021-09-14 DIAGNOSIS — R55 SYNCOPE AND COLLAPSE: Primary | ICD-10-CM

## 2021-09-14 LAB
-: ABNORMAL
ABSOLUTE EOS #: 0.1 K/UL (ref 0–0.4)
ABSOLUTE IMMATURE GRANULOCYTE: ABNORMAL K/UL (ref 0–0.3)
ABSOLUTE LYMPH #: 0.7 K/UL (ref 1–4.8)
ABSOLUTE MONO #: 0.7 K/UL (ref 0.1–1.2)
AMORPHOUS: ABNORMAL
ANION GAP SERPL CALCULATED.3IONS-SCNC: 10 MMOL/L (ref 9–17)
BACTERIA: ABNORMAL
BASOPHILS # BLD: 1 % (ref 0–2)
BASOPHILS ABSOLUTE: 0.1 K/UL (ref 0–0.2)
BILIRUBIN URINE: NEGATIVE
BUN BLDV-MCNC: 11 MG/DL (ref 6–20)
BUN/CREAT BLD: ABNORMAL (ref 9–20)
CALCIUM SERPL-MCNC: 8.9 MG/DL (ref 8.6–10.4)
CASTS UA: ABNORMAL /LPF
CHLORIDE BLD-SCNC: 100 MMOL/L (ref 98–107)
CO2: 22 MMOL/L (ref 20–31)
COLOR: YELLOW
COMMENT UA: ABNORMAL
CREAT SERPL-MCNC: 0.66 MG/DL (ref 0.5–0.9)
CRYSTALS, UA: ABNORMAL /HPF
DIFFERENTIAL TYPE: ABNORMAL
EOSINOPHILS RELATIVE PERCENT: 1 % (ref 1–4)
EPITHELIAL CELLS UA: ABNORMAL /HPF (ref 0–5)
GFR AFRICAN AMERICAN: >60 ML/MIN
GFR NON-AFRICAN AMERICAN: >60 ML/MIN
GFR SERPL CREATININE-BSD FRML MDRD: ABNORMAL ML/MIN/{1.73_M2}
GFR SERPL CREATININE-BSD FRML MDRD: ABNORMAL ML/MIN/{1.73_M2}
GLUCOSE BLD-MCNC: 92 MG/DL (ref 70–99)
GLUCOSE URINE: NEGATIVE
HCG QUALITATIVE: NEGATIVE
HCT VFR BLD CALC: 35.2 % (ref 36–46)
HEMOGLOBIN: 11.9 G/DL (ref 12–16)
IMMATURE GRANULOCYTES: ABNORMAL %
KETONES, URINE: NEGATIVE
LEUKOCYTE ESTERASE, URINE: NEGATIVE
LYMPHOCYTES # BLD: 6 % (ref 24–44)
MCH RBC QN AUTO: 31.1 PG (ref 26–34)
MCHC RBC AUTO-ENTMCNC: 33.9 G/DL (ref 31–37)
MCV RBC AUTO: 91.8 FL (ref 80–100)
MONOCYTES # BLD: 6 % (ref 2–11)
MUCUS: ABNORMAL
NITRITE, URINE: NEGATIVE
NRBC AUTOMATED: ABNORMAL PER 100 WBC
OTHER OBSERVATIONS UA: ABNORMAL
PDW BLD-RTO: 12.4 % (ref 12.5–15.4)
PH UA: 6 (ref 5–8)
PLATELET # BLD: 289 K/UL (ref 140–450)
PLATELET ESTIMATE: ABNORMAL
PMV BLD AUTO: 7.7 FL (ref 6–12)
POTASSIUM SERPL-SCNC: 3.9 MMOL/L (ref 3.7–5.3)
PROTEIN UA: NEGATIVE
RBC # BLD: 3.84 M/UL (ref 4–5.2)
RBC # BLD: ABNORMAL 10*6/UL
RBC UA: ABNORMAL /HPF (ref 0–2)
RENAL EPITHELIAL, UA: ABNORMAL /HPF
SEG NEUTROPHILS: 86 % (ref 36–66)
SEGMENTED NEUTROPHILS ABSOLUTE COUNT: 9.7 K/UL (ref 1.8–7.7)
SODIUM BLD-SCNC: 132 MMOL/L (ref 135–144)
SPECIFIC GRAVITY UA: 1.01 (ref 1–1.03)
TRICHOMONAS: ABNORMAL
TROPONIN INTERP: NORMAL
TROPONIN INTERP: NORMAL
TROPONIN T: NORMAL NG/ML
TROPONIN T: NORMAL NG/ML
TROPONIN, HIGH SENSITIVITY: <6 NG/L (ref 0–14)
TROPONIN, HIGH SENSITIVITY: <6 NG/L (ref 0–14)
TURBIDITY: ABNORMAL
URINE HGB: NEGATIVE
UROBILINOGEN, URINE: NORMAL
WBC # BLD: 11.2 K/UL (ref 3.5–11)
WBC # BLD: ABNORMAL 10*3/UL
WBC UA: ABNORMAL /HPF (ref 0–5)
YEAST: ABNORMAL

## 2021-09-14 PROCEDURE — 84703 CHORIONIC GONADOTROPIN ASSAY: CPT

## 2021-09-14 PROCEDURE — 6370000000 HC RX 637 (ALT 250 FOR IP): Performed by: EMERGENCY MEDICINE

## 2021-09-14 PROCEDURE — 71045 X-RAY EXAM CHEST 1 VIEW: CPT

## 2021-09-14 PROCEDURE — 84484 ASSAY OF TROPONIN QUANT: CPT

## 2021-09-14 PROCEDURE — 99284 EMERGENCY DEPT VISIT MOD MDM: CPT

## 2021-09-14 PROCEDURE — 80048 BASIC METABOLIC PNL TOTAL CA: CPT

## 2021-09-14 PROCEDURE — 36415 COLL VENOUS BLD VENIPUNCTURE: CPT

## 2021-09-14 PROCEDURE — 81001 URINALYSIS AUTO W/SCOPE: CPT

## 2021-09-14 PROCEDURE — 70450 CT HEAD/BRAIN W/O DYE: CPT

## 2021-09-14 PROCEDURE — 85025 COMPLETE CBC W/AUTO DIFF WBC: CPT

## 2021-09-14 PROCEDURE — 93005 ELECTROCARDIOGRAM TRACING: CPT | Performed by: EMERGENCY MEDICINE

## 2021-09-14 RX ORDER — IBUPROFEN 600 MG/1
600 TABLET ORAL ONCE
Status: COMPLETED | OUTPATIENT
Start: 2021-09-14 | End: 2021-09-14

## 2021-09-14 RX ADMIN — IBUPROFEN 600 MG: 600 TABLET, FILM COATED ORAL at 15:59

## 2021-09-14 ASSESSMENT — PAIN SCALES - GENERAL: PAINLEVEL_OUTOF10: 3

## 2021-09-14 NOTE — ED NOTES
Pt given written and verbal discharge, f/u instructions. Pt and  verbalizes understanding. Pt is ambulatory with steady gait accompanied by .       Karen Booker RN  09/14/21 [id number]

## 2021-09-14 NOTE — ED PROVIDER NOTES
[address] [facility]  [address] [facility] [address]  Phone: [phone]  Fax: [phone]      Pt Name: Muriel Vaughan  MRN: [id number]  [place] 1985  Date of evaluation: 9/14/2021    CHIEF COMPLAINT       Chief Complaint   Patient presents with    Loss of Consciousness       HISTORY OF PRESENT ILLNESS    Muriel Vaughan is a 39 y.o. female who presents to the emergency department following a syncopal event. 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 She got up to go to the bathroom and could not quite make it there. She got very lightheaded and had to lean against a wall and slide her way down. She then tried to get up to talk to her client when she struck the wall. Her client heard the injury and came out to help her. When she was helped she was suddenly more alert. Now complaining of just being tired. She did strike the right front of her head. No blurry vision or double vision no chest pain or shortness of breath. She says she is almost back to normal. She did have something to eat and drink this morning. She has not had anything like this before. She has been battling upper respiratory infection for the past couple of weeks and getting better from that.     REVIEW OF SYSTEMS       Constitutional: No fevers or chills positive diaphoresis resolved positive lightheadedness resolved  HEENT: No sore throat, rhinorrhea, or earache   Eyes: No blurry vision or double vision no drainage   Cardiovascular: No chest pain or tachycardia   Respiratory: No wheezing or shortness of breath no cough   Gastrointestinal: Positive nausea no vomiting diarrhea constipation or abdominal pain  : No hematuria or dysuria   Musculoskeletal: No extremity swelling or pain positive head injury  Skin: No rash   Neurological: No focal neurologic complaints, paresthesias, weakness, or headache     PAST MEDICAL HISTORY    has a past medical history of HPV in female. SURGICAL HISTORY      has a past surgical history that includes hernia repair and LEEP. CURRENT MEDICATIONS       Previous Medications    No medications on file       ALLERGIES     has No Known Allergies. FAMILY HISTORY     She indicated that the status of her mother is unknown. She indicated that the status of her maternal grandmother is unknown. She indicated that the status of her paternal grandfather is unknown. She indicated that the status of her other is unknown.     family history includes Cancer in her mother; Cancer (age of onset: 61) in an other family member; Other in her maternal grandmother and paternal grandfather. SOCIAL HISTORY      reports that she has never smoked. She has never used smokeless tobacco. She reports current alcohol use. She reports that she does not use drugs. PHYSICAL EXAM       ED Triage Vitals [09/14/21 1302]   BP Temp Temp Source Pulse Resp SpO2 Height Weight   117/73 98.7 °F (37.1 °C) Oral 78 15 99 % 5' 1.5\" (1.562 m) 120 lb (54.4 kg)     Constitutional: Alert, oriented x3, nontoxic, answering questions appropriately, acting properly for age, in no acute distress   HEENT: Extraocular muscles intact, mucus membranes moist, TMs clear bilaterally, no posterior pharyngeal erythema or exudates, Pupils equal, round, reactive to light, no photophobia positive hematoma over the right eyebrow/forehead measuring approximately 3 cm in diameter no step-off or deformity  Neck: Trachea midline no meningismus  Cardiovascular: Regular rhythm and rate no S3, S4, or murmurs   Respiratory: Clear to auscultation bilaterally no wheezes, rhonchi, rales, no respiratory distress no tachypnea no retractions no hypoxia  Gastrointestinal: Soft, nontender, nondistended, positive bowel sounds. No rebound, rigidity, or guarding.    Musculoskeletal: No extremity pain or swelling   Neurologic: Moving all 4 extremities without difficulty there are no gross focal neurologic deficits   Skin: Warm and dry     DIFFERENTIAL DIAGNOSIS/ MDM:     IV labs. EKG. CT head and chest x-ray. DIAGNOSTIC RESULTS     EKG: All EKG's are interpreted by the Emergency Department Physician who either signs or Co-signs this chart in the absence of a cardiologist.    1329 sinus rhythm rate 68  QRS 90  no acute ST or T wave changes. 1536 sinus rhythm rate 70  QRS 90  no acute ST or T wave changes.     Not indicated unless otherwise documented above    LABS:  Results for orders placed or performed during the hospital encounter of 09/14/21   Troponin   Result Value Ref Range    Troponin, High Sensitivity <6 0 - 14 ng/L    Troponin T NOT REPORTED <0.03 ng/mL    Troponin Interp NOT REPORTED    Troponin   Result Value Ref Range    Troponin, High Sensitivity <6 0 - 14 ng/L    Troponin T NOT REPORTED <0.03 ng/mL    Troponin Interp NOT REPORTED    CBC Auto Differential   Result Value Ref Range    WBC 11.2 (H) 3.5 - 11.0 k/uL    RBC 3.84 (L) 4.0 - 5.2 m/uL    Hemoglobin 11.9 (L) 12.0 - 16.0 g/dL    Hematocrit 35.2 (L) 36 - 46 %    MCV 91.8 80 - 100 fL    MCH 31.1 26 - 34 pg    MCHC 33.9 31 - 37 g/dL    RDW 12.4 (L) 12.5 - 15.4 %    Platelets 580 480 - 695 k/uL    MPV 7.7 6.0 - 12.0 fL    NRBC Automated NOT REPORTED per 100 WBC    Differential Type NOT REPORTED     Seg Neutrophils 86 (H) 36 - 66 %    Lymphocytes 6 (L) 24 - 44 %    Monocytes 6 2 - 11 %    Eosinophils % 1 1 - 4 %    Basophils 1 0 - 2 %    Immature Granulocytes NOT REPORTED 0 %    Segs Absolute 9.70 (H) 1.8 - 7.7 k/uL    Absolute Lymph # 0.70 (L) 1.0 - 4.8 k/uL    Absolute Mono # 0.70 0.1 - 1.2 k/uL    Absolute Eos # 0.10 0.0 - 0.4 k/uL    Basophils Absolute 0.10 0.0 - 0.2 k/uL    Absolute Immature Granulocyte NOT REPORTED 0.00 - 0.30 k/uL    WBC Morphology NOT REPORTED     RBC Morphology NOT REPORTED     Platelet Estimate NOT REPORTED    Basic Metabolic Panel   Result Value Ref Range    Glucose 92 70 - 99 mg/dL    BUN 11 6 - 20 mg/dL    CREATININE 0.66 0.50 - 0.90 mg/dL    Bun/Cre Ratio NOT REPORTED 9 - 20    Calcium 8.9 8.6 - 10.4 mg/dL    Sodium 132 (L) 135 - 144 mmol/L    Potassium 3.9 3.7 - 5.3 mmol/L    Chloride 100 98 - 107 mmol/L    CO2 22 20 - 31 mmol/L    Anion Gap 10 9 - 17 mmol/L    GFR Non-African American >60 >60 mL/min    GFR African American >60 >60 mL/min    GFR Comment          GFR Staging NOT REPORTED    Urinalysis Reflex to Culture    Specimen: Urine, clean catch   Result Value Ref Range    Color, UA YELLOW YELLOW    Turbidity UA SLIGHTLY CLOUDY (A) CLEAR    Glucose, Ur NEGATIVE NEGATIVE    Bilirubin Urine NEGATIVE NEGATIVE    Ketones, Urine NEGATIVE NEGATIVE    Specific Gravity, UA 1.010 1.005 - 1.030    Urine Hgb NEGATIVE NEGATIVE    pH, UA 6.0 5.0 - 8.0    Protein, UA NEGATIVE NEGATIVE    Urobilinogen, Urine Normal Normal    Nitrite, Urine NEGATIVE NEGATIVE    Leukocyte Esterase, Urine NEGATIVE NEGATIVE    Urinalysis Comments NOT REPORTED    HCG Qualitative, Serum   Result Value Ref Range    hCG Qual NEGATIVE NEGATIVE   Microscopic Urinalysis   Result Value Ref Range    -          WBC, UA 0 TO 2 0 - 5 /HPF    RBC, UA 0 TO 2 0 - 2 /HPF    Casts UA NOT REPORTED /LPF    Crystals, UA NOT REPORTED None /HPF    Epithelial Cells UA 50  0 - 5 /HPF    Renal Epithelial, UA NOT REPORTED 0 /HPF    Bacteria, UA FEW (A) None    Mucus, UA NOT REPORTED None    Trichomonas, UA NOT REPORTED None    Amorphous, UA NOT REPORTED None    Other Observations UA (A) NOT REQ. Utilizing a urinalysis as the only screening method to exclude a potential uropathogen can be unreliable in many patient populations. Rapid screening tests are less sensitive than culture and if UTI is a clinical possibility, culture should be considered despite a negative urinalysis.     Yeast, UA NOT REPORTED None       Not indicated unless otherwise documented above    RADIOLOGY:   I reviewed the radiologist CARE:    None    CONSULTS:    None    PROCEDURES:    None      OARRS Report if indicated             FINAL IMPRESSION      1. Syncope and collapse    2. Injury of head, initial encounter          DISPOSITION/PLAN   DISPOSITION          CONDITION ON DISPOSITION: STABLE       PATIENT REFERRED TO:  Family Physician - [facility]  [id number]  Schedule an appointment as soon as possible for a visit in 2 days      Leonardo Julio DO  [address]  [place] [address] [address]  [phone]    Call in 1 day        DISCHARGE MEDICATIONS:  New Prescriptions    No medications on file       (Please note that portions of this note were completed with a voice recognition program.  Efforts were made to edit the dictations but occasionally words are mis-transcribed.)    Alicia Hargrove DO   Attending Emergency Physician     Alicia Hargrove DO  09/14/21 0415

## 2021-09-14 NOTE — ED TRIAGE NOTES
Pt reports at work she became sweaty, nauseous and got up to ambulate to BR, became confused, and hit head, unsure on what, and does not have any memory of fall. Pt's client told  pt that she fell against wall and landed onto floor @ approximately 1130. Pt has red raised area to the Rt side forehead, no bleeding noted.

## 2021-09-15 LAB
EKG ATRIAL RATE: 68 BPM
EKG ATRIAL RATE: 70 BPM
EKG P AXIS: 62 DEGREES
EKG P AXIS: 68 DEGREES
EKG P-R INTERVAL: 140 MS
EKG P-R INTERVAL: 146 MS
EKG Q-T INTERVAL: 410 MS
EKG Q-T INTERVAL: 412 MS
EKG QRS DURATION: 90 MS
EKG QRS DURATION: 90 MS
EKG QTC CALCULATION (BAZETT): 435 MS
EKG QTC CALCULATION (BAZETT): 444 MS
EKG R AXIS: 36 DEGREES
EKG R AXIS: 43 DEGREES
EKG T AXIS: 33 DEGREES
EKG T AXIS: 51 DEGREES
EKG VENTRICULAR RATE: 68 BPM
EKG VENTRICULAR RATE: 70 BPM

## 2023-01-09 ENCOUNTER — OFFICE VISIT (OUTPATIENT)
Dept: OBGYN CLINIC | Age: 38
End: 2023-01-09
Payer: COMMERCIAL

## 2023-01-09 ENCOUNTER — HOSPITAL ENCOUNTER (OUTPATIENT)
Age: 38
Setting detail: SPECIMEN
Discharge: HOME | End: 2023-01-09

## 2023-01-09 VITALS
WEIGHT: 119 LBS | HEIGHT: 62 IN | DIASTOLIC BLOOD PRESSURE: 60 MMHG | SYSTOLIC BLOOD PRESSURE: 108 MMHG | BODY MASS INDEX: 21.9 KG/M2

## 2023-01-09 DIAGNOSIS — N89.8 VAGINAL DISCHARGE: Primary | ICD-10-CM

## 2023-01-09 PROCEDURE — G8484 FLU IMMUNIZE NO ADMIN: HCPCS | Performed by: ADVANCED PRACTICE MIDWIFE

## 2023-01-09 PROCEDURE — G8420 CALC BMI NORM PARAMETERS: HCPCS | Performed by: ADVANCED PRACTICE MIDWIFE

## 2023-01-09 PROCEDURE — 99213 OFFICE O/P EST LOW 20 MIN: CPT | Performed by: ADVANCED PRACTICE MIDWIFE

## 2023-01-09 PROCEDURE — 1036F TOBACCO NON-USER: CPT | Performed by: ADVANCED PRACTICE MIDWIFE

## 2023-01-09 PROCEDURE — G8427 DOCREV CUR MEDS BY ELIG CLIN: HCPCS | Performed by: ADVANCED PRACTICE MIDWIFE

## 2023-01-09 NOTE — PROGRESS NOTES
Patient is here today for vaginal itching and inflammation. Pt states no discharge but has some pain after using the bathroom.

## 2023-01-09 NOTE — PROGRESS NOTES
Carmen Hinkle (:  1985) is a 40 y.o. female,Established patient, here for evaluation of the following chief complaint(s):  Vaginal Itching       ASSESSMENT/PLAN:  1. Vaginal discharge  -     Vaginitis DNA Probe; Future      Subjective   SUBJECTIVE/OBJECTIVE:    HPI  Virgen Martin is here with complaints of vaginal itching x4 days. No abnormal discharge, odor or bleeding. Itching is more intense at night, tried Vagasil cream at home with little relief. Review of Systems  ABD: Denies abdominal pain or bloating  : Denies abnormal discharge, bleeding or odor. Menstrual: Is unsure of exact LMP, states she would welcome a pregnancy but would not like to test until missed menses. Objective   Physical Exam  External pelvic exam: vulva healthy appearing, no discharge or odor. Speculum exam: pink health cervix, no discharge. On this date 2023 I have spent 20 minutes reviewing previous notes, test results and face to face with the patient discussing the diagnosis and importance of compliance with the treatment plan as well as documenting on the day of the visit. Seen with Rocio NATH    An electronic signature was used to authenticate this note.     --JEAN Arnett CNM

## 2023-01-10 DIAGNOSIS — N89.8 VAGINAL DISCHARGE: ICD-10-CM

## 2023-01-10 LAB
CANDIDA SPECIES, DNA PROBE: POSITIVE
GARDNERELLA VAGINALIS, DNA PROBE: NEGATIVE
SOURCE: ABNORMAL
TRICHOMONAS VAGINALIS DNA: NEGATIVE

## 2023-01-11 ENCOUNTER — TELEPHONE (OUTPATIENT)
Dept: OBGYN CLINIC | Age: 38
End: 2023-01-11

## 2023-01-11 NOTE — TELEPHONE ENCOUNTER
Lm on patients voicemail to see if she wants the 3 day treatment or the Diflucan per the lab results note on 1/9/23    Patient returned call would like the Diflucan for treatment.

## 2023-01-12 RX ORDER — FLUCONAZOLE 150 MG/1
150 TABLET ORAL ONCE
Qty: 1 TABLET | Refills: 0 | Status: SHIPPED | OUTPATIENT
Start: 2023-01-12 | End: 2023-01-12

## 2023-03-14 ENCOUNTER — OFFICE VISIT (OUTPATIENT)
Dept: OBGYN CLINIC | Age: 38
End: 2023-03-14
Payer: COMMERCIAL

## 2023-03-14 VITALS
WEIGHT: 118 LBS | SYSTOLIC BLOOD PRESSURE: 108 MMHG | BODY MASS INDEX: 21.71 KG/M2 | DIASTOLIC BLOOD PRESSURE: 66 MMHG | HEIGHT: 62 IN

## 2023-03-14 DIAGNOSIS — Z01.419 ENCOUNTER FOR ANNUAL ROUTINE GYNECOLOGICAL EXAMINATION: Primary | ICD-10-CM

## 2023-03-14 PROCEDURE — G8484 FLU IMMUNIZE NO ADMIN: HCPCS | Performed by: CLINICAL NURSE SPECIALIST

## 2023-03-14 PROCEDURE — 99395 PREV VISIT EST AGE 18-39: CPT | Performed by: CLINICAL NURSE SPECIALIST

## 2023-03-14 ASSESSMENT — PATIENT HEALTH QUESTIONNAIRE - PHQ9
SUM OF ALL RESPONSES TO PHQ QUESTIONS 1-9: 0
SUM OF ALL RESPONSES TO PHQ9 QUESTIONS 1 & 2: 0
1. LITTLE INTEREST OR PLEASURE IN DOING THINGS: 0
2. FEELING DOWN, DEPRESSED OR HOPELESS: 0
SUM OF ALL RESPONSES TO PHQ QUESTIONS 1-9: 0

## 2023-03-14 ASSESSMENT — ANXIETY QUESTIONNAIRES
GAD7 TOTAL SCORE: 0
IF YOU CHECKED OFF ANY PROBLEMS ON THIS QUESTIONNAIRE, HOW DIFFICULT HAVE THESE PROBLEMS MADE IT FOR YOU TO DO YOUR WORK, TAKE CARE OF THINGS AT HOME, OR GET ALONG WITH OTHER PEOPLE: NOT DIFFICULT AT ALL
7. FEELING AFRAID AS IF SOMETHING AWFUL MIGHT HAPPEN: 0
1. FEELING NERVOUS, ANXIOUS, OR ON EDGE: 0
5. BEING SO RESTLESS THAT IT IS HARD TO SIT STILL: 0
6. BECOMING EASILY ANNOYED OR IRRITABLE: 0
3. WORRYING TOO MUCH ABOUT DIFFERENT THINGS: 0
4. TROUBLE RELAXING: 0
2. NOT BEING ABLE TO STOP OR CONTROL WORRYING: 0

## 2023-03-14 NOTE — PROGRESS NOTES
[facility] AND GYNECOLOGY  [address] [place] [address] [address].  Yousif renae [address] [place] [address]  Dept: [phone]        DATE OF VISIT:  3/14/23        History and Physical    Irish Garcia    :  1985  CHIEF COMPLAINT:    Chief Complaint   Patient presents with    Annual Exam                    Irish Garcia is a 40 y.o. female who presents for annual well woman exam.   Patient reports that in the left breast she did have sharp pain that lasted a few minutes then it was gone, nothing like that before and nothing since. The patient was seen and examined. Per the patient bowels are regular. She has no voiding complaints. She denies any bloating as well as vaginal discharge. Chaperone for Intimate Exam  Chaperone was offered as part of the rooming process. Patient declined and agrees to continue with exam without a chaperone. Chaperone: none     _____________________________________________________________________  Past Medical History:   Diagnosis Date    HPV in female                                                                    Past Surgical History:   Procedure Laterality Date    HERNIA REPAIR      LEEP       Family History   Problem Relation Age of Onset    Cancer Mother         pancreatic    Other Maternal Grandmother         bipolar    Other Paternal Grandfather         emphasemia    Cancer Other 61        lung great MGM     Social History     Tobacco Use   Smoking Status Never   Smokeless Tobacco Never     Social History     Substance and Sexual Activity   Alcohol Use Yes    Comment: social      Current Outpatient Medications   Medication Sig Dispense Refill    Prenatal MV-Min-Fe Fum-FA-DHA (PRENATAL+DHA PO) Take by mouth       No current facility-administered medications for this visit. Allergies:  No Known Allergies    Gynecologic History:  Patient's last menstrual period was 2023.   Sexually Active: Yes  STD History:No  Birth Control: No    OB History    Para Term  AB Living   2 1 1 0 1 1   SAB IAB Ectopic Molar Multiple Live Births   1 0 0 0 0 1     ______________________________________________________________________    Review of Systems    REVIEW OFSYSTEMS:        Constitutional:  Unexpected weight change, extreme fatigue, night sweats              no  Skin:                           Rashes, moles   no  Neurological:  Frequentheadaches, seizures         no  Ophthalmic:  Recent visual changes no  ENT:   Difficulty swallowing  no  Breast:              Masses, pain, nipple discharge                            no     Respiratory:  Shortness of breath, coughing           no    Cardiovascular: Chest pain   no     Gastrointestinal: Chronic diarrhea/constipation, nausea/vomiting           no   Urogenital:  Urinary incontinence, frequency, urgency          no                                         Heavy/irregular periods           no                                      Vaginal discharge                   no  Hematological: Bruises easy   no     Endocrine:  Hot flashes   no     Hot/Cold Intolerance  no    Psychological:            Mood and affect were within normal limits. yes                 Physical Exam    Physical Exam:    Vitals:    23 0957   BP: 108/66   Weight: 118 lb (53.5 kg)   Height: 5' 1.5\" (1.562 m)       General Appearance: This  is a well developed, well nourished, well groomed female. Her BMI was reviewed. Nutritional decision making andexercise were discussed. Neurological:  The patient is alert and oriented to time,place, person, and situation    Skin:  A brief inspection of the skin revealed no rashes or lesions. Neck:  The neck was supple. Respiratory: There was unlabored respiratory effort. Lungs clear to ascultation. Cardiovascular: The patients extremities were without calf tenderness or edema. Heart with a regular rate and rhythm.    Abdomen:  The abdomen was soft and non-tender with no guarding, rebound or rigidity.  No hernias were appreciated.     Breast:   The patients breasts were symmetrical.  There were no masses, discharge or retractions noted.  Self breast exams were reviewed.    Pelvic Exam:  The external genitalia was with a normal appearance.           The vaginal vault was normal. There were no cystocele, rectocele, or enterocele appreciated. There was no vaginal discharge.    The cervix was without lesions. There was no cervical motion tenderness.    The uterus was mobile, midline and regular.    The adnexa no fullness, tenderness or masses appreciated.          ASSESSMENT: Normal annual well woman exam    37 y.o.  Female; Annual   Diagnosis Orders   1. Encounter for annual routine gynecological examination                       PLAN:  - Discussed new papsmear guidelines.   - Birth control Discussed.  - Smoking risk factors Discussed  - Diet and exercise reviewed.   - Routine healthmaintenance per patients PCP.  - Return to clinic in 1 year or earlier with questions, problems, concerns.  Return for 1 year for Annual and as needed.        Electronically signed by JEAN Perrin CNP on 3/14/2023 at 10:20 AM

## 2023-08-03 ENCOUNTER — PROCEDURE VISIT (OUTPATIENT)
Dept: OBGYN CLINIC | Age: 38
End: 2023-08-03

## 2023-08-03 ENCOUNTER — OFFICE VISIT (OUTPATIENT)
Dept: OBGYN CLINIC | Age: 38
End: 2023-08-03
Payer: COMMERCIAL

## 2023-08-03 VITALS
BODY MASS INDEX: 23.76 KG/M2 | SYSTOLIC BLOOD PRESSURE: 108 MMHG | HEIGHT: 62 IN | HEART RATE: 73 BPM | DIASTOLIC BLOOD PRESSURE: 64 MMHG | WEIGHT: 129.1 LBS

## 2023-08-03 DIAGNOSIS — N91.2 AMENORRHEA: Primary | ICD-10-CM

## 2023-08-03 DIAGNOSIS — Z32.01 POSITIVE PREGNANCY TEST: ICD-10-CM

## 2023-08-03 PROBLEM — O22.41: Status: RESOLVED | Noted: 2017-06-08 | Resolved: 2023-08-03

## 2023-08-03 PROCEDURE — 99213 OFFICE O/P EST LOW 20 MIN: CPT | Performed by: ADVANCED PRACTICE MIDWIFE

## 2023-08-03 PROCEDURE — G8427 DOCREV CUR MEDS BY ELIG CLIN: HCPCS | Performed by: ADVANCED PRACTICE MIDWIFE

## 2023-08-03 PROCEDURE — 1036F TOBACCO NON-USER: CPT | Performed by: ADVANCED PRACTICE MIDWIFE

## 2023-08-03 PROCEDURE — G8420 CALC BMI NORM PARAMETERS: HCPCS | Performed by: ADVANCED PRACTICE MIDWIFE

## 2023-08-03 NOTE — PROGRESS NOTES
9 WK IUP  HR: 171 bpm  RT. OVARY: 2.06 x 1.65 x 1.48 cm  Unremarkable  LT. OVARY: 2.52 x 2.34 x 1.64 cm  Corpus luteum: 1.34 x 1.56 x 1.24 cm  No free fluid  Subchorionic hemorrhage seen right adjacent to the gestational sac: 1.21 x 0.41 x 0.46 cm

## 2023-08-03 NOTE — PROGRESS NOTES
Patient here for amenorrhea and states LMP- 6/7/23 and has no complaints today.
Amenorrhea   Dating US ordered today      Patient was seen with total face to face time of 20 minutes. More than 50% of this visit was spent face to face coordinating plan of care and answering questions    Return in about 4 weeks (around 8/31/2023) for new ob.     Electronically signed by: JEAN Gonzalez CNM

## 2023-08-28 ENCOUNTER — INITIAL PRENATAL (OUTPATIENT)
Dept: OBGYN CLINIC | Age: 38
End: 2023-08-28
Payer: COMMERCIAL

## 2023-08-28 ENCOUNTER — HOSPITAL ENCOUNTER (OUTPATIENT)
Age: 38
Setting detail: SPECIMEN
Discharge: HOME | End: 2023-08-28

## 2023-08-28 VITALS
SYSTOLIC BLOOD PRESSURE: 116 MMHG | WEIGHT: 134.4 LBS | DIASTOLIC BLOOD PRESSURE: 72 MMHG | HEART RATE: 88 BPM | BODY MASS INDEX: 24.98 KG/M2

## 2023-08-28 DIAGNOSIS — O09.529 HIGH-RISK PREGNANCY, MULTIGRAVIDA OF ADVANCED MATERNAL AGE, ANTEPARTUM: ICD-10-CM

## 2023-08-28 DIAGNOSIS — Z98.890 HISTORY OF LOOP ELECTROSURGICAL EXCISION PROCEDURE (LEEP) OF CERVIX AFFECTING PREGNANCY, ANTEPARTUM: Primary | ICD-10-CM

## 2023-08-28 DIAGNOSIS — O34.40 HISTORY OF LOOP ELECTROSURGICAL EXCISION PROCEDURE (LEEP) OF CERVIX AFFECTING PREGNANCY, ANTEPARTUM: Primary | ICD-10-CM

## 2023-08-28 PROCEDURE — G8427 DOCREV CUR MEDS BY ELIG CLIN: HCPCS | Performed by: ADVANCED PRACTICE MIDWIFE

## 2023-08-28 PROCEDURE — 0500F INITIAL PRENATAL CARE VISIT: CPT | Performed by: ADVANCED PRACTICE MIDWIFE

## 2023-08-28 PROCEDURE — G8420 CALC BMI NORM PARAMETERS: HCPCS | Performed by: ADVANCED PRACTICE MIDWIFE

## 2023-08-28 PROCEDURE — 36415 COLL VENOUS BLD VENIPUNCTURE: CPT | Performed by: ADVANCED PRACTICE MIDWIFE

## 2023-08-28 SDOH — ECONOMIC STABILITY: FOOD INSECURITY: WITHIN THE PAST 12 MONTHS, YOU WORRIED THAT YOUR FOOD WOULD RUN OUT BEFORE YOU GOT MONEY TO BUY MORE.: NEVER TRUE

## 2023-08-28 SDOH — ECONOMIC STABILITY: TRANSPORTATION INSECURITY
IN THE PAST 12 MONTHS, HAS LACK OF TRANSPORTATION KEPT YOU FROM MEETINGS, WORK, OR FROM GETTING THINGS NEEDED FOR DAILY LIVING?: NO

## 2023-08-28 SDOH — ECONOMIC STABILITY: HOUSING INSECURITY: IN THE LAST 12 MONTHS, HOW MANY PLACES HAVE YOU LIVED?: 1

## 2023-08-28 SDOH — ECONOMIC STABILITY: TRANSPORTATION INSECURITY
IN THE PAST 12 MONTHS, HAS THE LACK OF TRANSPORTATION KEPT YOU FROM MEDICAL APPOINTMENTS OR FROM GETTING MEDICATIONS?: NO

## 2023-08-28 SDOH — ECONOMIC STABILITY: INCOME INSECURITY: IN THE LAST 12 MONTHS, WAS THERE A TIME WHEN YOU WERE NOT ABLE TO PAY THE MORTGAGE OR RENT ON TIME?: NO

## 2023-08-28 SDOH — ECONOMIC STABILITY: FOOD INSECURITY: WITHIN THE PAST 12 MONTHS, THE FOOD YOU BOUGHT JUST DIDN'T LAST AND YOU DIDN'T HAVE MONEY TO GET MORE.: NEVER TRUE

## 2023-08-28 SDOH — ECONOMIC STABILITY: HOUSING INSECURITY
IN THE LAST 12 MONTHS, WAS THERE A TIME WHEN YOU DID NOT HAVE A STEADY PLACE TO SLEEP OR SLEPT IN A SHELTER (INCLUDING NOW)?: NO

## 2023-08-28 ASSESSMENT — ANXIETY QUESTIONNAIRES
2. NOT BEING ABLE TO STOP OR CONTROL WORRYING: 0
GAD7 TOTAL SCORE: 1
7. FEELING AFRAID AS IF SOMETHING AWFUL MIGHT HAPPEN: 0
4. TROUBLE RELAXING: 0
1. FEELING NERVOUS, ANXIOUS, OR ON EDGE: 0
IF YOU CHECKED OFF ANY PROBLEMS ON THIS QUESTIONNAIRE, HOW DIFFICULT HAVE THESE PROBLEMS MADE IT FOR YOU TO DO YOUR WORK, TAKE CARE OF THINGS AT HOME, OR GET ALONG WITH OTHER PEOPLE: NOT DIFFICULT AT ALL
6. BECOMING EASILY ANNOYED OR IRRITABLE: 1
3. WORRYING TOO MUCH ABOUT DIFFERENT THINGS: 0
5. BEING SO RESTLESS THAT IT IS HARD TO SIT STILL: 0

## 2023-08-28 ASSESSMENT — SOCIAL DETERMINANTS OF HEALTH (SDOH)
WITHIN THE LAST YEAR, HAVE YOU BEEN AFRAID OF YOUR PARTNER OR EX-PARTNER?: NO
WITHIN THE LAST YEAR, HAVE YOU BEEN KICKED, HIT, SLAPPED, OR OTHERWISE PHYSICALLY HURT BY YOUR PARTNER OR EX-PARTNER?: NO
WITHIN THE LAST YEAR, HAVE YOU BEEN HUMILIATED OR EMOTIONALLY ABUSED IN OTHER WAYS BY YOUR PARTNER OR EX-PARTNER?: NO
WITHIN THE LAST YEAR, HAVE TO BEEN RAPED OR FORCED TO HAVE ANY KIND OF SEXUAL ACTIVITY BY YOUR PARTNER OR EX-PARTNER?: NO
HOW HARD IS IT FOR YOU TO PAY FOR THE VERY BASICS LIKE FOOD, HOUSING, MEDICAL CARE, AND HEATING?: NOT HARD AT ALL

## 2023-08-28 NOTE — PROGRESS NOTES
116/72  New Obstetric Intake     Patient Jorge Luis Garrison  Patient Age: 40 y.o. Date of Visit: 2023  Patient's estimated gestational age at visit: 11w5d  Estimated Date of Delivery: 3/13/24    SUBJECTIVE:     Any Concerns Today: yes - patient requesting to do [address] or [address] today and also had multiple questions on genetic testing. Information and education provided. OB History          3    Para   1    Term   1            AB   1    Living   1         SAB   1    IAB        Ectopic        Molar        Multiple   0    Live Births   1          Obstetric Comments   [id number]               [place] Score:   Postpartum Depression: Low Risk     Last EPDS Total Score: 0    Last EPDS Self Harm Result: Never      History of postpartum depression: no    Generalized Anxiety Screening:  NICOLE 7 SCORE 2023 3/14/2023   NICOLE-7 Total Score 1 0     Interpretation of NICOLE-7 score: 5-9 = mild anxiety, 10-14 = moderate anxiety, 15+ = severe anxiety. Recommend referral to behavioral health for scores 10 or greater. Social Determinants of Health:   Financial Resource Strain: Low Risk     Difficulty of Paying Living Expenses: Not hard at all      Food Insecurity: No Food Insecurity    Worried About [place] in the Last Year: Never true    [address] in the Last Year: Never true      Transportation Needs: No Transportation Needs    Lack of Transportation (Medical): No    Lack of Transportation (Non-Medical): No      Intimate Partner Violence: Not At Risk    Fear of Current or Ex-Partner: No    Emotionally Abused: No    Physically Abused: No    Sexually Abused: No       OBJECTIVE:       /72   Pulse 88   Wt 134 lb 6.4 oz (61 kg)   LMP 2023 (Exact Date)      Medications:  Current Outpatient Medications   Medication Sig Dispense Refill    Prenatal MV-Min-Fe Fum-FA-DHA (PRENATAL+DHA PO) Take by mouth       No current facility-administered medications for this visit.        Information

## 2023-08-29 DIAGNOSIS — O09.529 HIGH-RISK PREGNANCY, MULTIGRAVIDA OF ADVANCED MATERNAL AGE, ANTEPARTUM: ICD-10-CM

## 2023-08-29 LAB
ABO + RH BLD: NORMAL
AMPHET UR QL SCN: NEGATIVE
BARBITURATES UR QL SCN: NEGATIVE
BENZODIAZ UR QL: NEGATIVE
BLOOD GROUP ANTIBODIES SERPL: NEGATIVE
CANNABINOIDS UR QL SCN: NEGATIVE
COCAINE UR QL SCN: NEGATIVE
FENTANYL UR QL: NEGATIVE
HIV 1+2 AB+HIV1 P24 AG SERPL QL IA: NONREACTIVE
METHADONE UR QL: NEGATIVE
OPIATES UR QL SCN: NEGATIVE
OXYCODONE UR QL SCN: NEGATIVE
PCP UR QL SCN: NEGATIVE
TEST INFORMATION: NORMAL

## 2023-08-30 LAB
25(OH)D3 SERPL-MCNC: 39.3 NG/ML
BASOPHILS # BLD: 0.04 K/UL (ref 0–0.2)
BASOPHILS NFR BLD: 1 % (ref 0–2)
CHLAMYDIA DNA UR QL NAA+PROBE: NEGATIVE
EOSINOPHIL # BLD: 0.26 K/UL (ref 0–0.44)
EOSINOPHILS RELATIVE PERCENT: 3 % (ref 1–4)
ERYTHROCYTE [DISTWIDTH] IN BLOOD BY AUTOMATED COUNT: 13.2 % (ref 11.8–14.4)
HBV SURFACE AG SERPL QL IA: NONREACTIVE
HCT VFR BLD AUTO: 39.2 % (ref 36.3–47.1)
HGB BLD-MCNC: 12.1 G/DL (ref 11.9–15.1)
IMM GRANULOCYTES # BLD AUTO: <0.03 K/UL (ref 0–0.3)
IMM GRANULOCYTES NFR BLD: 0 %
LYMPHOCYTES NFR BLD: 1.2 K/UL (ref 1.1–3.7)
LYMPHOCYTES RELATIVE PERCENT: 14 % (ref 24–43)
MCH RBC QN AUTO: 31 PG (ref 25.2–33.5)
MCHC RBC AUTO-ENTMCNC: 30.9 G/DL (ref 28.4–34.8)
MCV RBC AUTO: 100.5 FL (ref 82.6–102.9)
MICROORGANISM SPEC CULT: ABNORMAL
MONOCYTES NFR BLD: 0.57 K/UL (ref 0.1–1.2)
MONOCYTES NFR BLD: 7 % (ref 3–12)
N GONORRHOEA DNA UR QL NAA+PROBE: NEGATIVE
NEUTROPHILS NFR BLD: 75 % (ref 36–65)
NEUTS SEG NFR BLD: 6.47 K/UL (ref 1.5–8.1)
NRBC BLD-RTO: 0 PER 100 WBC
PLATELET # BLD AUTO: 281 K/UL (ref 138–453)
PMV BLD AUTO: 10.6 FL (ref 8.1–13.5)
RBC # BLD AUTO: 3.9 M/UL (ref 3.95–5.11)
RUBV IGG SERPL QL IA: >500 IU/ML
SPECIMEN DESCRIPTION: ABNORMAL
SPECIMEN DESCRIPTION: NORMAL
T PALLIDUM AB SER QL IA: NONREACTIVE
VZV IGG SER QL IA: 2.21
WBC OTHER # BLD: 8.6 K/UL (ref 3.5–11.3)

## 2023-08-31 LAB
HGB ELECTROPHORESIS INTERP: NORMAL
PATHOLOGIST: NORMAL

## 2023-09-25 ENCOUNTER — ROUTINE PRENATAL (OUTPATIENT)
Dept: OBGYN CLINIC | Age: 38
End: 2023-09-25

## 2023-09-25 VITALS
WEIGHT: 141 LBS | BODY MASS INDEX: 26.21 KG/M2 | HEART RATE: 88 BPM | DIASTOLIC BLOOD PRESSURE: 68 MMHG | SYSTOLIC BLOOD PRESSURE: 108 MMHG

## 2023-09-25 DIAGNOSIS — O09.529 HIGH-RISK PREGNANCY, MULTIGRAVIDA OF ADVANCED MATERNAL AGE, ANTEPARTUM: Primary | ICD-10-CM

## 2023-09-25 DIAGNOSIS — Z3A.15 15 WEEKS GESTATION OF PREGNANCY: ICD-10-CM

## 2023-09-25 PROCEDURE — 0502F SUBSEQUENT PRENATAL CARE: CPT

## 2023-09-25 NOTE — PROGRESS NOTES
Pt is here today at her 15.5 pnv  Pt states fetal movement is absent  Pt has no concerns today
coordination), spent in determining the total time component.       On this date September 25, 2023,  I have spent greater than 50% of this visit:  [x] Reviewing previous notes/pre-charting  [x] Reviewing test results  [x] Performing necessary physical exam/evaluation  [] Ordering medications, tests, procedures  [] Placing referrals or communicating with other HCP  [x] Documenting and updating Problem List as necessary  [] Importance of compliance with treatment plan discussed  [x] Counseling patient/support person  [] Coordinating care    Electronically signed by: Sonya Hatchet, APRN - CNM

## 2023-10-23 ENCOUNTER — ROUTINE PRENATAL (OUTPATIENT)
Dept: OBGYN CLINIC | Age: 38
End: 2023-10-23

## 2023-10-23 VITALS
SYSTOLIC BLOOD PRESSURE: 102 MMHG | DIASTOLIC BLOOD PRESSURE: 60 MMHG | HEART RATE: 73 BPM | BODY MASS INDEX: 27.51 KG/M2 | WEIGHT: 148 LBS

## 2023-10-23 DIAGNOSIS — O09.529 HIGH-RISK PREGNANCY, MULTIGRAVIDA OF ADVANCED MATERNAL AGE, ANTEPARTUM: Primary | ICD-10-CM

## 2023-10-23 DIAGNOSIS — Z23 NEEDS FLU SHOT: ICD-10-CM

## 2023-10-23 DIAGNOSIS — O34.40 HISTORY OF LOOP ELECTROSURGICAL EXCISION PROCEDURE (LEEP) OF CERVIX AFFECTING PREGNANCY, ANTEPARTUM: ICD-10-CM

## 2023-10-23 DIAGNOSIS — Z3A.19 19 WEEKS GESTATION OF PREGNANCY: ICD-10-CM

## 2023-10-23 DIAGNOSIS — Z98.890 HISTORY OF LOOP ELECTROSURGICAL EXCISION PROCEDURE (LEEP) OF CERVIX AFFECTING PREGNANCY, ANTEPARTUM: ICD-10-CM

## 2023-10-23 DIAGNOSIS — R82.71 GBS BACTERIURIA: ICD-10-CM

## 2023-10-23 PROCEDURE — 0502F SUBSEQUENT PRENATAL CARE: CPT | Performed by: ADVANCED PRACTICE MIDWIFE

## 2023-10-23 NOTE — PROGRESS NOTES
Pt is here today at her 19w5 prenatal visit  Pt states fetal movement is prsent  Pt has no concerns  Pt unsure about flu shot
the visit on this date of service by the [facility]  The time component involved both face-to-face (counseling and education) and non face-to-face time (care coordination), spent in determining the total time component.       On this date October 23, 2023, I have spent greater than 50% of this visit:  [x] Reviewing previous notes/pre-charting  [x] Reviewing test results  [x] Performing necessary physical exam/evaluation  [] Ordering medications, tests, procedures  [] Placing referrals or communicating with other HCP  [x] Documenting and updating Problem List as necessary  [] Importance of compliance with treatment plan discussed  [x] Counseling patient/support person  [] Coordinating care    Electronically signed by: JEAN Staples CNM

## 2023-10-30 ENCOUNTER — ROUTINE PRENATAL (OUTPATIENT)
Dept: PERINATAL CARE | Age: 38
End: 2023-10-30
Payer: COMMERCIAL

## 2023-10-30 VITALS
HEART RATE: 77 BPM | BODY MASS INDEX: 27.42 KG/M2 | WEIGHT: 149 LBS | DIASTOLIC BLOOD PRESSURE: 58 MMHG | TEMPERATURE: 97.9 F | RESPIRATION RATE: 16 BRPM | SYSTOLIC BLOOD PRESSURE: 115 MMHG | HEIGHT: 62 IN

## 2023-10-30 DIAGNOSIS — O34.40 HISTORY OF CERVICAL LEEP BIOPSY AFFECTING CARE OF MOTHER, ANTEPARTUM: ICD-10-CM

## 2023-10-30 DIAGNOSIS — O09.522 MULTIGRAVIDA OF ADVANCED MATERNAL AGE IN SECOND TRIMESTER: Primary | ICD-10-CM

## 2023-10-30 DIAGNOSIS — Z3A.20 20 WEEKS GESTATION OF PREGNANCY: ICD-10-CM

## 2023-10-30 DIAGNOSIS — O44.40 LOW IMPLANTATION OF PLACENTA WITHOUT HEMORRHAGE: ICD-10-CM

## 2023-10-30 DIAGNOSIS — Z98.890 HISTORY OF CERVICAL LEEP BIOPSY AFFECTING CARE OF MOTHER, ANTEPARTUM: ICD-10-CM

## 2023-10-30 DIAGNOSIS — O35.2XX0 HEREDITARY FAMILIAL DISEASE AFFECTING MANAGEMENT OF MOTHER AND POSSIBLY AFFECTING FETUS, ANTEPARTUM, SINGLE OR UNSPECIFIED FETUS: ICD-10-CM

## 2023-10-30 LAB
ABDOMINAL CIRCUMFERENCE: NORMAL
ABDOMINAL CIRCUMFERENCE: NORMAL
BIPARIETAL DIAMETER: NORMAL
BIPARIETAL DIAMETER: NORMAL
ESTIMATED FETAL WEIGHT: NORMAL
ESTIMATED FETAL WEIGHT: NORMAL
FEMORAL DIAMETER: NORMAL
FEMORAL DIAMETER: NORMAL
HC/AC: NORMAL
HC/AC: NORMAL
HEAD CIRCUMFERENCE: NORMAL
HEAD CIRCUMFERENCE: NORMAL

## 2023-10-30 PROCEDURE — 76811 OB US DETAILED SNGL FETUS: CPT | Performed by: OBSTETRICS & GYNECOLOGY

## 2023-10-30 PROCEDURE — 99999 PR OFFICE/OUTPT VISIT,PROCEDURE ONLY: CPT | Performed by: OBSTETRICS & GYNECOLOGY

## 2023-10-30 PROCEDURE — 76817 TRANSVAGINAL US OBSTETRIC: CPT | Performed by: OBSTETRICS & GYNECOLOGY

## 2023-10-30 NOTE — PROGRESS NOTES
Obstetric/Gynecology Maternal Fetal Medicine Resident Note    Patient declines formal consult with MFM attending physician for AMA, APA, Family History of Down's Syndrome (FOB's Aunt).      Mark Gallagher MD  OBGYN Resident, PGY-2  [facility]  10/30/2023, 9:29 AM

## 2023-11-27 ENCOUNTER — ROUTINE PRENATAL (OUTPATIENT)
Dept: OBGYN CLINIC | Age: 38
End: 2023-11-27
Payer: COMMERCIAL

## 2023-11-27 ENCOUNTER — HOSPITAL ENCOUNTER (OUTPATIENT)
Age: 38
Setting detail: SPECIMEN
Discharge: HOME | End: 2023-11-27

## 2023-11-27 VITALS
SYSTOLIC BLOOD PRESSURE: 118 MMHG | BODY MASS INDEX: 29.48 KG/M2 | WEIGHT: 158.6 LBS | HEART RATE: 80 BPM | DIASTOLIC BLOOD PRESSURE: 62 MMHG

## 2023-11-27 DIAGNOSIS — R82.71 GBS BACTERIURIA: ICD-10-CM

## 2023-11-27 DIAGNOSIS — O09.529 HIGH-RISK PREGNANCY, MULTIGRAVIDA OF ADVANCED MATERNAL AGE, ANTEPARTUM: ICD-10-CM

## 2023-11-27 DIAGNOSIS — Z3A.24 24 WEEKS GESTATION OF PREGNANCY: Primary | ICD-10-CM

## 2023-11-27 DIAGNOSIS — Z3A.24 24 WEEKS GESTATION OF PREGNANCY: ICD-10-CM

## 2023-11-27 DIAGNOSIS — O44.40 LOW-LYING PLACENTA: ICD-10-CM

## 2023-11-27 DIAGNOSIS — Z23 NEEDS FLU SHOT: ICD-10-CM

## 2023-11-27 PROCEDURE — 90471 IMMUNIZATION ADMIN: CPT

## 2023-11-27 PROCEDURE — 90674 CCIIV4 VAC NO PRSV 0.5 ML IM: CPT

## 2023-11-27 PROCEDURE — 0502F SUBSEQUENT PRENATAL CARE: CPT

## 2023-11-27 NOTE — PROGRESS NOTES
Pt is here today at her 24 week pnv with 1Hour Glucose   Pt states fetal movement is good   Pt has no concerns , would like the flu vaccine     Glucose 50mg drink given at 9:32 am.   finished at 9:35am   Blood draw will be at 10:35 am

## 2023-11-27 NOTE — PROGRESS NOTES
Blood draw was collected at 10:35 for 1 hour glucose and cbc. After obtaining consent, and per orders of Shaye Segura,APRN-CNM  injection of Influenza given in Left deltoid by Kayla Ramon MA. Patient instructed to remain in clinic for 20 minutes afterwards, and to report any adverse reaction to me immediately.   [address] - [id number]  [id number]  EXP- 06/30/20274

## 2023-11-27 NOTE — PROGRESS NOTES
SUBJECTIVE:  Ebenezer Torres is here for her return OB visit. She reports fetal movement. She denies vaginal bleeding. She denies vaginal discharge. She denies leaking of fluid. She denies uterine contraction activity. She denies nausea and/or vomiting. She denies retaining fluid in her extremities. Ebenezer Torres reports she is feeling well. Traveling to [facility] soon, discussed masking on the plane. OBJECTIVE:  Blood pressure 118/62, pulse 80, weight 71.9 kg (158 lb 9.6 oz), last menstrual period 2023, not currently breastfeeding. Pregravid BMI: 23.61   TW.3 kg (31 lb 9.6 oz)    Ebenezer Torres has not the Tdap vaccine as appropriate, (offer between 27-37 weeks)  Ebenezer Torres has not received the influenza vaccine as appropriate - doing today    Postpartum Depression: Low Risk  (2023)    [place]  Depression Scale     Last EPDS Total Score: 0     Last EPDS Self Harm Result: Never        ASSESSMENT/PLAN:  IUP @ 24w5d  Ebenezer Torres will monitor fetal movement daily. 28 week lab panel was discussed and was ordered. Ebenezer Torres declined repeat HIV and T. Pallidum testing. Glucola testing was initiated during this visit. RhoGam was not discussed/not indicated. Given 24 week packet   Birth plan started today  S = D  Diagnoses and all orders for this visit:    24 weeks gestation of pregnancy  -     Glucose Tolerance, 1 Hr; Future  -     CBC with Auto Differential; Future  -     Influenza, FLUCELVAX, (age 10 mo+), IM, PF, 0.5 mL    High-risk pregnancy, multigravida of advanced maternal age, antepartum    GBS bacteriuria  Treat in labor    Needs flu shot  Doing today    Low-lying placenta  20.77mm from os  Follow-up       Return in about 4 weeks (around 2023) for Prenatal visit.      The patient, Heather Carreon, was seen with a total time spent of 30 minutes for the visit on this date of service by the [facility]  The time component involved both face-to-face (counseling and education) and non

## 2023-11-28 LAB
BASOPHILS # BLD: <0.03 K/UL (ref 0–0.2)
BASOPHILS NFR BLD: 0 % (ref 0–2)
EOSINOPHIL # BLD: 0.33 K/UL (ref 0–0.44)
EOSINOPHILS RELATIVE PERCENT: 3 % (ref 1–4)
ERYTHROCYTE [DISTWIDTH] IN BLOOD BY AUTOMATED COUNT: 13.3 % (ref 11.8–14.4)
GLUCOSE 1H P 50 G GLC PO SERPL-MCNC: 102 MG/DL (ref 70–135)
GLUCOSE ADMINISTRATION: NORMAL
HCT VFR BLD AUTO: 36.7 % (ref 36.3–47.1)
HGB BLD-MCNC: 11.3 G/DL (ref 11.9–15.1)
IMM GRANULOCYTES # BLD AUTO: 0.11 K/UL (ref 0–0.3)
IMM GRANULOCYTES NFR BLD: 1 %
LYMPHOCYTES NFR BLD: 1.33 K/UL (ref 1.1–3.7)
LYMPHOCYTES RELATIVE PERCENT: 12 % (ref 24–43)
MCH RBC QN AUTO: 31.6 PG (ref 25.2–33.5)
MCHC RBC AUTO-ENTMCNC: 30.8 G/DL (ref 28.4–34.8)
MCV RBC AUTO: 102.5 FL (ref 82.6–102.9)
MONOCYTES NFR BLD: 0.66 K/UL (ref 0.1–1.2)
MONOCYTES NFR BLD: 6 % (ref 3–12)
NEUTROPHILS NFR BLD: 78 % (ref 36–65)
NEUTS SEG NFR BLD: 8.41 K/UL (ref 1.5–8.1)
NRBC BLD-RTO: 0 PER 100 WBC
PLATELET # BLD AUTO: ABNORMAL K/UL (ref 138–453)
PLATELET, FLUORESCENCE: 262 K/UL (ref 138–453)
PLATELETS.RETICULATED NFR BLD AUTO: 2.7 % (ref 1.1–10.3)
RBC # BLD AUTO: 3.58 M/UL (ref 3.95–5.11)
WBC OTHER # BLD: 10.9 K/UL (ref 3.5–11.3)

## 2023-12-26 ENCOUNTER — ROUTINE PRENATAL (OUTPATIENT)
Dept: OBGYN CLINIC | Age: 38
End: 2023-12-26
Payer: COMMERCIAL

## 2023-12-26 VITALS
DIASTOLIC BLOOD PRESSURE: 80 MMHG | HEART RATE: 82 BPM | SYSTOLIC BLOOD PRESSURE: 112 MMHG | WEIGHT: 166 LBS | BODY MASS INDEX: 30.86 KG/M2

## 2023-12-26 DIAGNOSIS — O44.40 LOW-LYING PLACENTA: ICD-10-CM

## 2023-12-26 DIAGNOSIS — O09.529 HIGH-RISK PREGNANCY, MULTIGRAVIDA OF ADVANCED MATERNAL AGE, ANTEPARTUM: Primary | ICD-10-CM

## 2023-12-26 DIAGNOSIS — G56.00 CARPAL TUNNEL SYNDROME DURING PREGNANCY: ICD-10-CM

## 2023-12-26 DIAGNOSIS — R82.71 GBS BACTERIURIA: ICD-10-CM

## 2023-12-26 DIAGNOSIS — Z23 NEEDS FLU SHOT: ICD-10-CM

## 2023-12-26 DIAGNOSIS — Z3A.28 28 WEEKS GESTATION OF PREGNANCY: ICD-10-CM

## 2023-12-26 DIAGNOSIS — O26.899 CARPAL TUNNEL SYNDROME DURING PREGNANCY: ICD-10-CM

## 2023-12-26 PROCEDURE — 0502F SUBSEQUENT PRENATAL CARE: CPT

## 2023-12-26 PROCEDURE — 90715 TDAP VACCINE 7 YRS/> IM: CPT

## 2023-12-26 PROCEDURE — 90471 IMMUNIZATION ADMIN: CPT

## 2023-12-26 NOTE — PROGRESS NOTES
SUBJECTIVE:  Balwinder Larsen is here for her return OB visit. She reports fetal movement. She denies vaginal bleeding. She denies vaginal discharge. She denies leaking of fluid. She denies uterine contraction activity. She denies nausea and/or vomiting. She denies retaining fluid in her extremities. She denies headache, vision changes, RUQ pain, and epigastric pain. Balwinder Larsen reports she is feeling well, no concerns today. Having some carpal tunnel. OBJECTIVE:  Blood pressure 112/80, pulse 82, weight 75.3 kg (166 lb), last menstrual period 2023, not currently breastfeeding. Pregravid BMI: 23.61   TW.7 kg (39 lb)    Balwinder Larsen has not received the Tdap vaccine as appropriate - doing today  Balwinder Larsen has received the influenza vaccine as appropriate  Rhogam was not indicated    ASSESSMENT/PLAN:  IUP @ 28w6d  Balwinder Larsen will monitor fetal movement daily. [x] 28 week lab results were reviewed. Glucola 102, Hgb 11.3. [x] Fetal Kick Count was discussed and explained. She was instructed to lay on her left side 20 minutes after eating and count movements for up to 2 hours with a target value of 10 movements. Instructed to notify office if she does not make the target. [x] [place] contractions vs  labor contractions were reviewed. [x] Signs and symptoms of Pre-Eclampsia were reviewed and discussed. [x] Initial discussion regarding birth plans was begun. [x] Given 36 week birth packet for review. S = D  Balwinder Larsen was seen today for routine prenatal visit.     Diagnoses and all orders for this visit:    High-risk pregnancy, multigravida of advanced maternal age, antepartum  Declined genetics  Chart to be sent to collaborator    Low-lying placenta  20.77mm from os  Follow-up     GBS bacteriuria  Treat in labor    Carpal tunnel syndrome during pregnancy  Discussed wearing wrist splints at night      After reviewing and updating the problem list, the chart was sent to physician for

## 2023-12-28 ENCOUNTER — ROUTINE PRENATAL (OUTPATIENT)
Dept: PERINATAL CARE | Age: 38
End: 2023-12-28
Payer: COMMERCIAL

## 2023-12-28 VITALS
BODY MASS INDEX: 30.55 KG/M2 | HEART RATE: 90 BPM | HEIGHT: 62 IN | SYSTOLIC BLOOD PRESSURE: 113 MMHG | TEMPERATURE: 97 F | RESPIRATION RATE: 16 BRPM | DIASTOLIC BLOOD PRESSURE: 61 MMHG | WEIGHT: 166.01 LBS

## 2023-12-28 DIAGNOSIS — O09.523 MULTIGRAVIDA OF ADVANCED MATERNAL AGE IN THIRD TRIMESTER: Primary | ICD-10-CM

## 2023-12-28 DIAGNOSIS — O35.2XX0 HEREDITARY FAMILIAL DISEASE AFFECTING MANAGEMENT OF MOTHER AND POSSIBLY AFFECTING FETUS, ANTEPARTUM, SINGLE OR UNSPECIFIED FETUS: ICD-10-CM

## 2023-12-28 DIAGNOSIS — Z13.89 ENCOUNTER FOR ROUTINE SCREENING FOR MALFORMATION USING ULTRASONICS: ICD-10-CM

## 2023-12-28 DIAGNOSIS — Z98.890 HISTORY OF CERVICAL LEEP BIOPSY AFFECTING CARE OF MOTHER, ANTEPARTUM: ICD-10-CM

## 2023-12-28 DIAGNOSIS — Z36.4 ULTRASOUND FOR ANTENATAL SCREENING FOR FETAL GROWTH RESTRICTION: ICD-10-CM

## 2023-12-28 DIAGNOSIS — O44.40 LOW IMPLANTATION OF PLACENTA WITHOUT HEMORRHAGE: ICD-10-CM

## 2023-12-28 DIAGNOSIS — O34.40 HISTORY OF CERVICAL LEEP BIOPSY AFFECTING CARE OF MOTHER, ANTEPARTUM: ICD-10-CM

## 2023-12-28 DIAGNOSIS — Z3A.29 29 WEEKS GESTATION OF PREGNANCY: ICD-10-CM

## 2023-12-28 PROCEDURE — 76805 OB US >/= 14 WKS SNGL FETUS: CPT | Performed by: OBSTETRICS & GYNECOLOGY

## 2023-12-28 PROCEDURE — 76817 TRANSVAGINAL US OBSTETRIC: CPT | Performed by: OBSTETRICS & GYNECOLOGY

## 2023-12-28 PROCEDURE — 99999 PR OFFICE/OUTPT VISIT,PROCEDURE ONLY: CPT | Performed by: OBSTETRICS & GYNECOLOGY

## 2023-12-28 PROCEDURE — 76819 FETAL BIOPHYS PROFIL W/O NST: CPT | Performed by: OBSTETRICS & GYNECOLOGY

## 2024-01-12 ENCOUNTER — ROUTINE PRENATAL (OUTPATIENT)
Dept: OBGYN CLINIC | Age: 39
End: 2024-01-12

## 2024-01-12 VITALS
BODY MASS INDEX: 30.86 KG/M2 | DIASTOLIC BLOOD PRESSURE: 76 MMHG | WEIGHT: 166 LBS | HEART RATE: 76 BPM | SYSTOLIC BLOOD PRESSURE: 106 MMHG

## 2024-01-12 DIAGNOSIS — Z3A.31 31 WEEKS GESTATION OF PREGNANCY: ICD-10-CM

## 2024-01-12 DIAGNOSIS — O09.529 HIGH-RISK PREGNANCY, MULTIGRAVIDA OF ADVANCED MATERNAL AGE, ANTEPARTUM: Primary | ICD-10-CM

## 2024-01-12 PROCEDURE — 0502F SUBSEQUENT PRENATAL CARE: CPT

## 2024-01-12 SDOH — HEALTH STABILITY: PHYSICAL HEALTH: ON AVERAGE, HOW MANY DAYS PER WEEK DO YOU ENGAGE IN MODERATE TO STRENUOUS EXERCISE (LIKE A BRISK WALK)?: 2 DAYS

## 2024-01-12 SDOH — HEALTH STABILITY: PHYSICAL HEALTH: ON AVERAGE, HOW MANY MINUTES DO YOU ENGAGE IN EXERCISE AT THIS LEVEL?: 20 MIN

## 2024-01-12 NOTE — PROGRESS NOTES
SUBJECTIVE:  Elva is here for her return OB visit.  She reports fetal movement.  She denies vaginal bleeding.  She denies vaginal discharge.  She denies leaking of fluid.  She denies uterine contraction activity.  She denies nausea and/or vomiting.  She denies retaining fluid in her extremities.  She denies headache, vision changes, RUQ pain, and epigastric pain.  Elva reports feeling well today. No complaints or questions at this tmie.     OBJECTIVE:  Blood pressure 106/76, pulse 76, weight 75.3 kg (166 lb), last menstrual period 2023, not currently breastfeeding.     Pregravid BMI: 23.61   TW.7 kg (39 lb)    Elva has received the Tdap vaccine as appropriate  Elva has received the influenza vaccine as appropriate  Rhogam was not indicated    ASSESSMENT/PLAN:  IUP @ 31w2d  Elva will monitor fetal movement daily.   [x] 28 week lab results were reviewed. Glucola 102, Hgb 11.3.   [x] Fetal Kick Count was discussed and explained. She was instructed to lay on her left side 20 minutes after eating and count movements for up to 2 hours with a target value of 10 movements. Instructed to notify office if she does not make the target.  [x] [place]-Gray contractions vs  labor contractions were reviewed.  [x] Signs and symptoms of Pre-Eclampsia were reviewed and discussed.  [x] Initial discussion regarding birth plans was begun.  [] Given 36 week birth packet for review.    S = D  Elva was seen today for routine prenatal visit.    Diagnoses and all orders for this visit:    High-risk pregnancy, multigravida of advanced maternal age, antepartum    31 weeks gestation of pregnancy      Patient Active Problem List    Diagnosis Date Noted    GBS bacteriuria 10/23/2023     Priority: High     23 Gbs bacteriuria 10-50,000 - no tx indicated  PCN G in labor - reviewed 10/23/2023       Low-lying placenta 2023     20.7mm from os  Follow-up with MFM       RECEIVED flu shot 10/23/2023

## 2024-01-15 ENCOUNTER — OFFICE VISIT (OUTPATIENT)
Dept: PRIMARY CARE CLINIC | Age: 39
End: 2024-01-15
Payer: COMMERCIAL

## 2024-01-15 VITALS
HEIGHT: 62 IN | HEART RATE: 85 BPM | BODY MASS INDEX: 31.21 KG/M2 | WEIGHT: 169.6 LBS | RESPIRATION RATE: 14 BRPM | SYSTOLIC BLOOD PRESSURE: 112 MMHG | OXYGEN SATURATION: 100 % | DIASTOLIC BLOOD PRESSURE: 64 MMHG

## 2024-01-15 DIAGNOSIS — B07.8 OTHER VIRAL WARTS: Primary | ICD-10-CM

## 2024-01-15 DIAGNOSIS — G56.01 CARPAL TUNNEL SYNDROME OF RIGHT WRIST: ICD-10-CM

## 2024-01-15 DIAGNOSIS — Z3A.31 31 WEEKS GESTATION OF PREGNANCY: ICD-10-CM

## 2024-01-15 PROBLEM — F41.9 ANXIETY: Status: RESOLVED | Noted: 2017-06-08 | Resolved: 2024-01-15

## 2024-01-15 PROCEDURE — G8482 FLU IMMUNIZE ORDER/ADMIN: HCPCS | Performed by: NURSE PRACTITIONER

## 2024-01-15 PROCEDURE — 99204 OFFICE O/P NEW MOD 45 MIN: CPT | Performed by: NURSE PRACTITIONER

## 2024-01-15 PROCEDURE — G8427 DOCREV CUR MEDS BY ELIG CLIN: HCPCS | Performed by: NURSE PRACTITIONER

## 2024-01-15 PROCEDURE — 1036F TOBACCO NON-USER: CPT | Performed by: NURSE PRACTITIONER

## 2024-01-15 PROCEDURE — G8419 CALC BMI OUT NRM PARAM NOF/U: HCPCS | Performed by: NURSE PRACTITIONER

## 2024-01-15 ASSESSMENT — PATIENT HEALTH QUESTIONNAIRE - PHQ9
SUM OF ALL RESPONSES TO PHQ QUESTIONS 1-9: 0
SUM OF ALL RESPONSES TO PHQ QUESTIONS 1-9: 0
SUM OF ALL RESPONSES TO PHQ9 QUESTIONS 1 & 2: 0
1. LITTLE INTEREST OR PLEASURE IN DOING THINGS: 0
SUM OF ALL RESPONSES TO PHQ QUESTIONS 1-9: 0
2. FEELING DOWN, DEPRESSED OR HOPELESS: 0
SUM OF ALL RESPONSES TO PHQ QUESTIONS 1-9: 0

## 2024-01-15 ASSESSMENT — ENCOUNTER SYMPTOMS
RHINORRHEA: 0
SORE THROAT: 0
COLOR CHANGE: 0
NAUSEA: 0
SHORTNESS OF BREATH: 0
ABDOMINAL PAIN: 0
VOMITING: 0
DIARRHEA: 0
CHEST TIGHTNESS: 0

## 2024-01-15 NOTE — PROGRESS NOTES
[facility]  [facility] PRIMARY [facility]  [address] [place] [address] [address]  [address]  [facility] [address]  Dept: [phone]  Dept Fax: [phone]    lEva Katz is a 38 y.o. female who presentstoday for her medical conditions/complaints as noted below.  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  Wearing carpal tunnel brace on right wrist, this is helping with pain  Has wart on left forearm for about 1 month, using topical salicylic acid for 2 weeks and wart is improving         No results found for: \"LABA1C\"          ( goal A1C is < 7)   No components found for: \"LABMICR\"  No results found for: \"LDLCHOLESTEROL\", \"LDLCALC\"    (goal LDL is <100)   BUN (mg/dL)   Date Value   09/14/2021 11     BP Readings from Last 3 Encounters:   01/15/24 112/64   01/12/24 106/76   12/28/23 113/61          ([id number])    Past Medical History:   Diagnosis Date    Abnormal Pap smear of cervix 2004/2005    HPV    Anxiety 06/08/2017    HPV in female     STD (sexually transmitted disease) 2004/2005    HPV      Past Surgical History:   Procedure Laterality Date    HERNIA REPAIR      89- groin    LEEP      2004       Family History   Problem Relation Age of Onset    Other Paternal Grandfather         emphasemia    Other Maternal Grandmother         bipolar    Cancer Mother         Pancreatic Cancer    Cancer Other 60        lung great MGM       Social History     Tobacco Use    Smoking status: Never    Smokeless tobacco: Never   Substance Use Topics

## 2024-01-26 ENCOUNTER — ROUTINE PRENATAL (OUTPATIENT)
Dept: OBGYN CLINIC | Age: 39
End: 2024-01-26

## 2024-01-26 VITALS
SYSTOLIC BLOOD PRESSURE: 116 MMHG | DIASTOLIC BLOOD PRESSURE: 68 MMHG | HEART RATE: 91 BPM | WEIGHT: 170 LBS | BODY MASS INDEX: 31.6 KG/M2

## 2024-01-26 DIAGNOSIS — O44.40 LOW-LYING PLACENTA: ICD-10-CM

## 2024-01-26 DIAGNOSIS — Z3A.33 33 WEEKS GESTATION OF PREGNANCY: Primary | ICD-10-CM

## 2024-01-26 PROCEDURE — 0502F SUBSEQUENT PRENATAL CARE: CPT

## 2024-01-26 NOTE — PROGRESS NOTES
SUBJECTIVE:  Elva is here for her return OB visit.  She reports fetal movement.  She denies vaginal bleeding.  She denies vaginal discharge.  She denies leaking of fluid.  She denies uterine contraction activity.  She denies nausea and/or vomiting.  She denies retaining fluid in her extremities.  She denies headache, vision changes, RUQ pain, and epigastric pain.  Elva reports feeling well overall. Discussed her low lying placenta and what the recommendation is for distance from the cervical os. Patient is really desiring a vaginal birth.       OBJECTIVE:  Blood pressure 116/68, pulse 91, weight 77.1 kg (170 lb), last menstrual period 2023, not currently breastfeeding.     Pregravid BMI: 23.61   TW.5 kg (43 lb)    Elva has received the Tdap vaccine as appropriate  Rhogam was not indicated    ASSESSMENT/PLAN:  IUP @ 33w2d  Elva will monitor fetal movement daily.   [x] 28 week lab results were reviewed.   [x] Fetal Kick Count was discussed and explained. She was instructed to lay on her left side 20 minutes after eating and count movements for up to 2 hours with a target value of 10 movements. Instructed to notify office if she does not make the target.  [x] [place]-Gray contractions vs  labor contractions were reviewed.  [x] Signs and symptoms of Pre-Eclampsia were reviewed and discussed.  [] Initial discussion regarding birth plans was begun.  [] Given 36 week birth packet for review.    S = D  Elva was seen today for routine prenatal visit.    Diagnoses and all orders for this visit:    33 weeks gestation of pregnancy    Low-lying placenta  Has follow up MFM scan on 24      Return in about 2 weeks (around 2024) for return ob.     The patient, Elva Katz, was seen with a total time spent of 35 minutes for the visit on this date of service by the [facility]  The time component involved both face-to-face (counseling and education) and non face-to-face time (care

## 2024-02-01 ENCOUNTER — ROUTINE PRENATAL (OUTPATIENT)
Dept: PERINATAL CARE | Age: 39
End: 2024-02-01
Payer: COMMERCIAL

## 2024-02-01 VITALS
TEMPERATURE: 98.2 F | SYSTOLIC BLOOD PRESSURE: 112 MMHG | DIASTOLIC BLOOD PRESSURE: 67 MMHG | BODY MASS INDEX: 31.65 KG/M2 | RESPIRATION RATE: 16 BRPM | WEIGHT: 172 LBS | HEART RATE: 87 BPM | HEIGHT: 62 IN

## 2024-02-01 DIAGNOSIS — O44.40 LOW IMPLANTATION OF PLACENTA WITHOUT HEMORRHAGE: ICD-10-CM

## 2024-02-01 DIAGNOSIS — O35.2XX0 HEREDITARY FAMILIAL DISEASE AFFECTING MANAGEMENT OF MOTHER AND POSSIBLY AFFECTING FETUS, ANTEPARTUM, SINGLE OR UNSPECIFIED FETUS: ICD-10-CM

## 2024-02-01 DIAGNOSIS — Z03.72 SUSPECTED PLACENTAL PROBLEM NOT FOUND: ICD-10-CM

## 2024-02-01 DIAGNOSIS — Z13.89 ENCOUNTER FOR ROUTINE SCREENING FOR MALFORMATION USING ULTRASONICS: ICD-10-CM

## 2024-02-01 DIAGNOSIS — Z3A.34 34 WEEKS GESTATION OF PREGNANCY: ICD-10-CM

## 2024-02-01 DIAGNOSIS — O09.523 MULTIGRAVIDA OF ADVANCED MATERNAL AGE IN THIRD TRIMESTER: Primary | ICD-10-CM

## 2024-02-01 DIAGNOSIS — Z36.4 ULTRASOUND FOR ANTENATAL SCREENING FOR FETAL GROWTH RESTRICTION: ICD-10-CM

## 2024-02-01 PROCEDURE — 99999 PR OFFICE/OUTPT VISIT,PROCEDURE ONLY: CPT | Performed by: OBSTETRICS & GYNECOLOGY

## 2024-02-01 PROCEDURE — 76816 OB US FOLLOW-UP PER FETUS: CPT | Performed by: OBSTETRICS & GYNECOLOGY

## 2024-02-01 PROCEDURE — 76819 FETAL BIOPHYS PROFIL W/O NST: CPT | Performed by: OBSTETRICS & GYNECOLOGY

## 2024-02-01 PROCEDURE — 76817 TRANSVAGINAL US OBSTETRIC: CPT | Performed by: OBSTETRICS & GYNECOLOGY

## 2024-02-01 NOTE — PATIENT INSTRUCTIONS
Patient advised to follow up with referring physician.   Additional Notes: More samples of otelza given. PA to continue with more information Detail Level: Simple

## 2024-02-04 NOTE — PROGRESS NOTES
SUBJECTIVE:    Elva is here for her routine OB visit.  She is experiencing aches of late pregnancy; she is reporting swelling of face, hands and feet. No longer resolving.  Occasional mild HA that resolves  Has questions  She reports  fetal movement.  She denies  vaginal bleeding.  She denies  leaking of fluid.  She denies  vaginal discharge.  She denies  uterine contraction activity.  She denies  nausea and/or vomiting.    She denies headache, visual changes, epigastric discomfort      OBJECTIVE:   Blood pressure 118/64, weight 78.9 kg (174 lb), last menstrual period 06/06/2023, not currently breastfeeding.        11/27/2023    10:15 AM   Postpartum Depression Scale   I have been able to laugh and see the funny side of things As much as I always could   I have looked forward with enjoyment to things As much as I ever did   I have blamed myself unnecessarily when things went wrong Not very often   I have been anxious or worried for no good reason No, not at all   I have felt scared or panicky for no good reason No, not at all   I haven't been able to cope lately No, I have been coping as well as ever   I have been so unhappy that I have had difficulty sleeping Not at all   I have felt sad or miserable No, not at all   I have been so unhappy that I have been crying No, never   The thought of harming myself has occurred to me Never   Total 1            1/15/2024    10:22 AM 3/14/2023    10:00 AM 3/6/2020    11:29 AM   PHQ Scores   PHQ2 Score 0 0 0   PHQ9 Score 0 0 0     Interpretation of Total Score Depression Severity: 1-4 = Minimal depression, 5-9 = Mild depression, 10-14 = Moderate depression, 15-19 = Moderately severe depression, 20-27 = Severe depression       8/28/2023    10:00 AM 3/14/2023     9:00 AM   NICOLE 7 SCORE   NICOLE-7 Total Score 1 0     Interpretation of NICOLE-7 score: 5-9 = mild anxiety, 10-14 = moderate anxiety, 15+ = severe anxiety. Recommend referral to behavioral health for scores 10 or greater.

## 2024-02-05 ENCOUNTER — ROUTINE PRENATAL (OUTPATIENT)
Dept: OBGYN CLINIC | Age: 39
End: 2024-02-05

## 2024-02-05 VITALS — WEIGHT: 174 LBS | SYSTOLIC BLOOD PRESSURE: 118 MMHG | BODY MASS INDEX: 32.34 KG/M2 | DIASTOLIC BLOOD PRESSURE: 64 MMHG

## 2024-02-05 DIAGNOSIS — Z3A.34 34 WEEKS GESTATION OF PREGNANCY: ICD-10-CM

## 2024-02-05 DIAGNOSIS — Z23 NEED FOR TDAP VACCINATION: ICD-10-CM

## 2024-02-05 DIAGNOSIS — O09.529 HIGH-RISK PREGNANCY, MULTIGRAVIDA OF ADVANCED MATERNAL AGE, ANTEPARTUM: Primary | ICD-10-CM

## 2024-02-05 DIAGNOSIS — R82.71 GBS BACTERIURIA: ICD-10-CM

## 2024-02-05 PROCEDURE — 0502F SUBSEQUENT PRENATAL CARE: CPT | Performed by: ADVANCED PRACTICE MIDWIFE

## 2024-02-05 NOTE — PROGRESS NOTES
Pt is here today at her 34w5 prenatal visit  Pt states fetal movement is present  Pt has concerns about swelling

## 2024-02-19 ENCOUNTER — ROUTINE PRENATAL (OUTPATIENT)
Dept: OBGYN CLINIC | Age: 39
End: 2024-02-19

## 2024-02-19 VITALS
BODY MASS INDEX: 33.09 KG/M2 | HEART RATE: 90 BPM | WEIGHT: 178 LBS | SYSTOLIC BLOOD PRESSURE: 112 MMHG | DIASTOLIC BLOOD PRESSURE: 70 MMHG

## 2024-02-19 DIAGNOSIS — O09.529 HIGH-RISK PREGNANCY, MULTIGRAVIDA OF ADVANCED MATERNAL AGE, ANTEPARTUM: Primary | ICD-10-CM

## 2024-02-19 DIAGNOSIS — R82.71 GBS BACTERIURIA: ICD-10-CM

## 2024-02-19 DIAGNOSIS — Z3A.36 36 WEEKS GESTATION OF PREGNANCY: ICD-10-CM

## 2024-02-19 PROCEDURE — 0502F SUBSEQUENT PRENATAL CARE: CPT | Performed by: ADVANCED PRACTICE MIDWIFE

## 2024-02-19 NOTE — PROGRESS NOTES
SUBJECTIVE:  Elva is here for her routine OB visit.  She reports fetal movement.  She denies vaginal bleeding.  She denies leaking of fluid.  She denies vaginal discharge.  She denies uterine contraction activity.  She denies nausea and/or vomiting.  She denies retaining fluid in her extremities  She denies headache, visual changes, epigastric discomfort  Elva has a few questions today about prenatal expression of colostrum, when to arrive to L&D when in labor, santamaria hour, and recommendation for induction of labor.   Surprise!      OBJECTIVE:   Blood pressure 112/70, pulse 90, weight 80.7 kg (178 lb), last menstrual period 2023, not currently breastfeeding.    Pregravid BMI: 23.61   TW.1 kg (51 lb)    SVE:  deferred    Elva has received the Tdap vaccine as appropriate  Elva has received the influenza vaccine as appropriate  Rhogam was not indicated    ASSESSMENT/PLAN:  IUP @ 36w5d  Elva will monitor for fetal movements daily  [x]  GBS culture was not obtained, hx GBS bacteriuria   [x]  28 week lab results were reviewed. Glucola 102, Hgb 11.3.   [x]  Signs of labor to report were  and when to call midwives was discussed  [x]  Birth preferences were discussed.  [x]  Has been given 36 week birth packet for review.  [x]  Signs and symptoms of Pre-Eclampsia were not reviewed and discussed.  []  Post-dates testing and protocol were not discussed  []  Elva was not counseled regarding Post Partum Depression and help  []  She has not decided on contraceptive choice.  []  Chart has been reviewed by collaborating physician.   S = D  High-risk pregnancy, multigravida of advanced maternal age, antepartum  Declined genetics   MFM U/S 24 normal - f/u PRN   Reviewed IOL 39-40 weeks for age >35 and she declines  GBS bacteriuria  Reviewed PCN G protocol for hx GBS bacteriuria        []  Induction management was discussed and induction scheduled - see above    Return in about 1 week (around 2024)

## 2024-02-26 ENCOUNTER — ROUTINE PRENATAL (OUTPATIENT)
Dept: OBGYN CLINIC | Age: 39
End: 2024-02-26

## 2024-02-26 VITALS
WEIGHT: 181.1 LBS | HEART RATE: 82 BPM | DIASTOLIC BLOOD PRESSURE: 70 MMHG | SYSTOLIC BLOOD PRESSURE: 122 MMHG | BODY MASS INDEX: 33.66 KG/M2

## 2024-02-26 DIAGNOSIS — Z3A.37 37 WEEKS GESTATION OF PREGNANCY: Primary | ICD-10-CM

## 2024-02-26 DIAGNOSIS — R82.71 GBS BACTERIURIA: ICD-10-CM

## 2024-02-26 DIAGNOSIS — O09.529 HIGH-RISK PREGNANCY, MULTIGRAVIDA OF ADVANCED MATERNAL AGE, ANTEPARTUM: ICD-10-CM

## 2024-02-26 PROCEDURE — 0502F SUBSEQUENT PRENATAL CARE: CPT | Performed by: ADVANCED PRACTICE MIDWIFE

## 2024-02-26 NOTE — PROGRESS NOTES
SUBJECTIVE:    Elva is here for her routine OB visit.  She reports  fetal movement.  She denies  vaginal bleeding.  She denies  leaking of fluid.  She denies  vaginal discharge.  She denies  uterine contraction activity.  She denies  nausea and/or vomiting.  She denies retaining fluid in her extremities.  Ready for labor.       OBJECTIVE:   Last menstrual period 06/06/2023, not currently breastfeeding.          ASSESSMENT/PLAN:  1. High-risk pregnancy, multigravida of advanced maternal age, antepartum      2. GBS bacteriuria      3. 37 weeks gestation of pregnancy        The problem list was reviewed and updated as needed.    Elva will monitor for fetal movements daily    GBS protocol and testing was discussed.  GBS culture was not obtained.  Results were not reviewed.  Signs of labor to report were discussed.  Birth preferences were discussed.  Post-dates testing and protocol were not discussed  Elva was not counseled regarding Post Partum Depression.  She has not decided on contraceptive choice.    Elva was counseled regarding all of the above    The patient, Elva Katz,  was seen with a total time spent of 20 minutes for the [facility] date [facility]  The time component, involved both face-to-face (counseling and education)  and non face-to-face time (care coordination), spent in determining the total time component.

## 2024-03-04 ENCOUNTER — ROUTINE PRENATAL (OUTPATIENT)
Dept: OBGYN CLINIC | Age: 39
End: 2024-03-04

## 2024-03-04 VITALS
WEIGHT: 181 LBS | HEART RATE: 101 BPM | BODY MASS INDEX: 33.65 KG/M2 | SYSTOLIC BLOOD PRESSURE: 118 MMHG | DIASTOLIC BLOOD PRESSURE: 80 MMHG

## 2024-03-04 DIAGNOSIS — O09.529 HIGH-RISK PREGNANCY, MULTIGRAVIDA OF ADVANCED MATERNAL AGE, ANTEPARTUM: Primary | ICD-10-CM

## 2024-03-04 DIAGNOSIS — R82.71 GBS BACTERIURIA: ICD-10-CM

## 2024-03-04 PROCEDURE — 0502F SUBSEQUENT PRENATAL CARE: CPT

## 2024-03-04 NOTE — PROGRESS NOTES
SUBJECTIVE:  Elva is here for her routine OB visit.  She reports fetal movement.  She denies vaginal bleeding.  She denies leaking of fluid.  She denies vaginal discharge.  She denies uterine contraction activity.  She denies nausea and/or vomiting.  She denies retaining fluid in her extremities  She denies headache, visual changes, epigastric discomfort  Elva reports she is feeling well, no concerns today.    OBJECTIVE:   Blood pressure 118/80, pulse (!) 101, weight 82.1 kg (181 lb), last menstrual period 2023, not currently breastfeeding.    Pregravid BMI: 23.61   TW.5 kg (54 lb)    SVE:  discussed and deferred    Elva has received the Tdap vaccine as appropriate  Rhogam was not indicated    ASSESSMENT/PLAN:  IUP @ 38w5d  Elva will monitor for fetal movements daily  [x]  GBS culture results reviewed - bacteriuria  [x]  28 week lab results were reviewed. Glucola 102, Hgb 11.3.   [x]  Signs of labor to report were  and when to call midwives was discussed  [x]  Birth preferences were discussed.  [x]  Has been given 36 week birth packet for review.  [x]  Signs and symptoms of Pre-Eclampsia were reviewed and discussed.  [x]  Post-dates testing and protocol were discussed  [x]  Elva was not counseled regarding Post Partum Depression and help  [x]  She has not decided on contraceptive choice.  [x]  Chart has been reviewed by collaborating physician.     S = D    Elva was seen today for routine prenatal visit.    Diagnoses and all orders for this visit:    High-risk pregnancy, multigravida of advanced maternal age, antepartum  Declined genetics   MFM U/S 24 normal - f/u PRN   Reviewed IOL 39-40 weeks for age >35 and she declines, discussed again today and she will consider at 40 weeks    GBS bacteriuria  Reviewed PCN G protocol for hx GBS bacteriuria        Return in about 1 week (around 3/11/2024) for OB visit with midwives.     Elva was counseled regarding all of the above    The

## 2024-03-11 ENCOUNTER — ROUTINE PRENATAL (OUTPATIENT)
Dept: OBGYN CLINIC | Age: 39
End: 2024-03-11

## 2024-03-11 VITALS
WEIGHT: 181 LBS | DIASTOLIC BLOOD PRESSURE: 80 MMHG | HEART RATE: 91 BPM | BODY MASS INDEX: 33.65 KG/M2 | SYSTOLIC BLOOD PRESSURE: 118 MMHG

## 2024-03-11 DIAGNOSIS — O09.529 HIGH-RISK PREGNANCY, MULTIGRAVIDA OF ADVANCED MATERNAL AGE, ANTEPARTUM: Primary | ICD-10-CM

## 2024-03-11 DIAGNOSIS — Z3A.39 39 WEEKS GESTATION OF PREGNANCY: ICD-10-CM

## 2024-03-11 DIAGNOSIS — R82.71 GBS BACTERIURIA: ICD-10-CM

## 2024-03-11 PROCEDURE — 0502F SUBSEQUENT PRENATAL CARE: CPT | Performed by: ADVANCED PRACTICE MIDWIFE

## 2024-03-11 NOTE — PROGRESS NOTES
SUBJECTIVE:    Elva is here for her routine OB visit.  She reports  fetal movement.  She denies  vaginal bleeding.  She denies  leaking of fluid.  She denies  vaginal discharge.  She denies  uterine contraction activity.  She denies  nausea and/or vomiting.  She denies retaining fluid in her extremities.  Concerned about induction of labor, she prefers spontaneous. We discussed and she is agreeable to NST/BPP next visit if not delivered.      OBJECTIVE:   Blood pressure 118/80, pulse 91, weight 82.1 kg (181 lb), last menstrual period 06/06/2023, not currently breastfeeding.    SVE soft 50 1cm -2      ASSESSMENT/PLAN:  1. High-risk pregnancy, multigravida of advanced maternal age, antepartum      2. 39 weeks gestation of pregnancy      3. GBS bacteriuria        The problem list was reviewed and updated as needed.    Elva will monitor for fetal movements daily    GBS protocol and testing was discussed.  GBS culture was obtained.  Results were reviewed.  Signs of labor to report were discussed.  Birth preferences were discussed.  Post-dates testing and protocol were discussed  Elva was not counseled regarding Post Partum Depression.  She has not decided on contraceptive choice.    Elva was counseled regarding all of the above    The patient, Elva Katz,  was seen with a total time spent of 20 minutes for the visit on this date [facility]  The time component, involved both face-to-face (counseling and education)  and non face-to-face time (care coordination), spent in determining the total time component.

## 2024-03-16 ENCOUNTER — HOSPITAL ENCOUNTER (INPATIENT)
Age: 39
LOS: 2 days | Discharge: HOME | End: 2024-03-18
Attending: STUDENT IN AN ORGANIZED HEALTH CARE EDUCATION/TRAINING PROGRAM | Admitting: STUDENT IN AN ORGANIZED HEALTH CARE EDUCATION/TRAINING PROGRAM
Payer: COMMERCIAL

## 2024-03-16 PROBLEM — Z3A.40 40 WEEKS GESTATION OF PREGNANCY: Status: ACTIVE | Noted: 2024-03-16

## 2024-03-16 PROBLEM — Z37.9 NORMAL LABOR: Status: ACTIVE | Noted: 2024-03-16

## 2024-03-16 LAB
ABO + RH BLD: NORMAL
AMPHET UR QL SCN: NEGATIVE
ARM BAND NUMBER: NORMAL
BARBITURATES UR QL SCN: NEGATIVE
BASOPHILS # BLD: 0.06 K/UL (ref 0–0.2)
BASOPHILS NFR BLD: 1 % (ref 0–2)
BENZODIAZ UR QL: NEGATIVE
BLOOD BANK SAMPLE EXPIRATION: NORMAL
BLOOD GROUP ANTIBODIES SERPL: NEGATIVE
CANNABINOIDS UR QL SCN: NEGATIVE
COCAINE UR QL SCN: NEGATIVE
EOSINOPHIL # BLD: 0.62 K/UL (ref 0–0.44)
EOSINOPHILS RELATIVE PERCENT: 5 % (ref 1–4)
ERYTHROCYTE [DISTWIDTH] IN BLOOD BY AUTOMATED COUNT: 13.2 % (ref 11.8–14.4)
FENTANYL UR QL: NEGATIVE
HCT VFR BLD AUTO: 38.9 % (ref 36.3–47.1)
HGB BLD-MCNC: 13.3 G/DL (ref 11.9–15.1)
IMM GRANULOCYTES # BLD AUTO: 0.16 K/UL (ref 0–0.3)
IMM GRANULOCYTES NFR BLD: 1 %
LYMPHOCYTES NFR BLD: 1.49 K/UL (ref 1.1–3.7)
LYMPHOCYTES RELATIVE PERCENT: 12 % (ref 24–43)
MCH RBC QN AUTO: 32.3 PG (ref 25.2–33.5)
MCHC RBC AUTO-ENTMCNC: 34.2 G/DL (ref 28.4–34.8)
MCV RBC AUTO: 94.4 FL (ref 82.6–102.9)
METHADONE UR QL: NEGATIVE
MONOCYTES NFR BLD: 0.93 K/UL (ref 0.1–1.2)
MONOCYTES NFR BLD: 7 % (ref 3–12)
NEUTROPHILS NFR BLD: 74 % (ref 36–65)
NEUTS SEG NFR BLD: 9.7 K/UL (ref 1.5–8.1)
NRBC BLD-RTO: 0 PER 100 WBC
OPIATES UR QL SCN: NEGATIVE
OXYCODONE UR QL SCN: NEGATIVE
PCP UR QL SCN: NEGATIVE
PLATELET # BLD AUTO: 193 K/UL (ref 138–453)
PMV BLD AUTO: 10.2 FL (ref 8.1–13.5)
RBC # BLD AUTO: 4.12 M/UL (ref 3.95–5.11)
T PALLIDUM AB SER QL IA: NONREACTIVE
TEST INFORMATION: NORMAL
WBC OTHER # BLD: 13 K/UL (ref 3.5–11.3)

## 2024-03-16 PROCEDURE — 86780 TREPONEMA PALLIDUM: CPT

## 2024-03-16 PROCEDURE — 0KQM0ZZ REPAIR PERINEUM MUSCLE, OPEN APPROACH: ICD-10-PCS

## 2024-03-16 PROCEDURE — 6370000000 HC RX 637 (ALT 250 FOR IP)

## 2024-03-16 PROCEDURE — 86900 BLOOD TYPING SEROLOGIC ABO: CPT

## 2024-03-16 PROCEDURE — 80307 DRUG TEST PRSMV CHEM ANLYZR: CPT

## 2024-03-16 PROCEDURE — 88307 TISSUE EXAM BY PATHOLOGIST: CPT

## 2024-03-16 PROCEDURE — 6360000002 HC RX W HCPCS

## 2024-03-16 PROCEDURE — 86850 RBC ANTIBODY SCREEN: CPT

## 2024-03-16 PROCEDURE — 2580000003 HC RX 258

## 2024-03-16 PROCEDURE — 7200000001 HC VAGINAL DELIVERY

## 2024-03-16 PROCEDURE — 2500000003 HC RX 250 WO HCPCS

## 2024-03-16 PROCEDURE — 85025 COMPLETE CBC W/AUTO DIFF WBC: CPT

## 2024-03-16 PROCEDURE — 86901 BLOOD TYPING SEROLOGIC RH(D): CPT

## 2024-03-16 PROCEDURE — 10907ZC DRAINAGE OF AMNIOTIC FLUID, THERAPEUTIC FROM PRODUCTS OF CONCEPTION, VIA NATURAL OR ARTIFICIAL OPENING: ICD-10-PCS

## 2024-03-16 PROCEDURE — 6360000002 HC RX W HCPCS: Performed by: STUDENT IN AN ORGANIZED HEALTH CARE EDUCATION/TRAINING PROGRAM

## 2024-03-16 RX ORDER — SODIUM CHLORIDE 9 MG/ML
INJECTION, SOLUTION INTRAVENOUS PRN
Status: DISCONTINUED | OUTPATIENT
Start: 2024-03-16 | End: 2024-03-18 | Stop reason: HOSPADM

## 2024-03-16 RX ORDER — LANOLIN 72 %
OINTMENT (GRAM) TOPICAL PRN
Status: DISCONTINUED | OUTPATIENT
Start: 2024-03-16 | End: 2024-03-18 | Stop reason: HOSPADM

## 2024-03-16 RX ORDER — LIDOCAINE HYDROCHLORIDE 10 MG/ML
10 INJECTION, SOLUTION EPIDURAL; INFILTRATION; INTRACAUDAL; PERINEURAL ONCE
Status: DISCONTINUED | OUTPATIENT
Start: 2024-03-16 | End: 2024-03-16

## 2024-03-16 RX ORDER — SODIUM CHLORIDE 0.9 % (FLUSH) 0.9 %
5-40 SYRINGE (ML) INJECTION PRN
Status: DISCONTINUED | OUTPATIENT
Start: 2024-03-16 | End: 2024-03-16

## 2024-03-16 RX ORDER — ACETAMINOPHEN 500 MG
1000 TABLET ORAL EVERY 6 HOURS PRN
Qty: 90 TABLET | Refills: 0 | Status: SHIPPED | OUTPATIENT
Start: 2024-03-16

## 2024-03-16 RX ORDER — MISOPROSTOL 100 UG/1
400 TABLET ORAL PRN
Status: DISCONTINUED | OUTPATIENT
Start: 2024-03-16 | End: 2024-03-16

## 2024-03-16 RX ORDER — SODIUM CHLORIDE 0.9 % (FLUSH) 0.9 %
5-40 SYRINGE (ML) INJECTION EVERY 12 HOURS SCHEDULED
Status: DISCONTINUED | OUTPATIENT
Start: 2024-03-16 | End: 2024-03-16

## 2024-03-16 RX ORDER — SODIUM CHLORIDE, SODIUM LACTATE, POTASSIUM CHLORIDE, AND CALCIUM CHLORIDE .6; .31; .03; .02 G/100ML; G/100ML; G/100ML; G/100ML
1000 INJECTION, SOLUTION INTRAVENOUS PRN
Status: DISCONTINUED | OUTPATIENT
Start: 2024-03-16 | End: 2024-03-16

## 2024-03-16 RX ORDER — SENNOSIDES A AND B 8.6 MG/1
1 TABLET, FILM COATED ORAL 2 TIMES DAILY PRN
Qty: 30 TABLET | Refills: 0 | Status: SHIPPED | OUTPATIENT
Start: 2024-03-16 | End: 2024-04-15

## 2024-03-16 RX ORDER — ONDANSETRON 2 MG/ML
4 INJECTION INTRAMUSCULAR; INTRAVENOUS EVERY 4 HOURS PRN
Status: DISCONTINUED | OUTPATIENT
Start: 2024-03-16 | End: 2024-03-18 | Stop reason: HOSPADM

## 2024-03-16 RX ORDER — SODIUM CHLORIDE 9 MG/ML
25 INJECTION, SOLUTION INTRAVENOUS PRN
Status: DISCONTINUED | OUTPATIENT
Start: 2024-03-16 | End: 2024-03-16

## 2024-03-16 RX ORDER — SODIUM CHLORIDE, SODIUM LACTATE, POTASSIUM CHLORIDE, CALCIUM CHLORIDE 600; 310; 30; 20 MG/100ML; MG/100ML; MG/100ML; MG/100ML
INJECTION, SOLUTION INTRAVENOUS CONTINUOUS
Status: DISCONTINUED | OUTPATIENT
Start: 2024-03-16 | End: 2024-03-16

## 2024-03-16 RX ORDER — SODIUM CHLORIDE 0.9 % (FLUSH) 0.9 %
5-40 SYRINGE (ML) INJECTION EVERY 12 HOURS SCHEDULED
Status: DISCONTINUED | OUTPATIENT
Start: 2024-03-16 | End: 2024-03-18 | Stop reason: HOSPADM

## 2024-03-16 RX ORDER — IBUPROFEN 600 MG/1
600 TABLET ORAL EVERY 6 HOURS
Status: DISCONTINUED | OUTPATIENT
Start: 2024-03-16 | End: 2024-03-18 | Stop reason: HOSPADM

## 2024-03-16 RX ORDER — TRANEXAMIC ACID 10 MG/ML
1000 INJECTION, SOLUTION INTRAVENOUS
Status: DISCONTINUED | OUTPATIENT
Start: 2024-03-16 | End: 2024-03-16

## 2024-03-16 RX ORDER — SIMETHICONE 80 MG
80 TABLET,CHEWABLE ORAL EVERY 6 HOURS PRN
Status: DISCONTINUED | OUTPATIENT
Start: 2024-03-16 | End: 2024-03-18 | Stop reason: HOSPADM

## 2024-03-16 RX ORDER — LIDOCAINE HYDROCHLORIDE 10 MG/ML
INJECTION, SOLUTION INFILTRATION; PERINEURAL
Status: COMPLETED
Start: 2024-03-16 | End: 2024-03-16

## 2024-03-16 RX ORDER — CARBOPROST TROMETHAMINE 250 UG/ML
250 INJECTION, SOLUTION INTRAMUSCULAR PRN
Status: DISCONTINUED | OUTPATIENT
Start: 2024-03-16 | End: 2024-03-16

## 2024-03-16 RX ORDER — IBUPROFEN 600 MG/1
600 TABLET ORAL EVERY 6 HOURS PRN
Qty: 90 TABLET | Refills: 1 | Status: SHIPPED | OUTPATIENT
Start: 2024-03-16 | End: 2024-05-15

## 2024-03-16 RX ORDER — SENNA AND DOCUSATE SODIUM 50; 8.6 MG/1; MG/1
2 TABLET, FILM COATED ORAL DAILY PRN
Status: DISCONTINUED | OUTPATIENT
Start: 2024-03-16 | End: 2024-03-18 | Stop reason: HOSPADM

## 2024-03-16 RX ORDER — KETOROLAC TROMETHAMINE 30 MG/ML
30 INJECTION, SOLUTION INTRAMUSCULAR; INTRAVENOUS ONCE
Status: COMPLETED | OUTPATIENT
Start: 2024-03-16 | End: 2024-03-16

## 2024-03-16 RX ORDER — ONDANSETRON 2 MG/ML
4 INJECTION INTRAMUSCULAR; INTRAVENOUS EVERY 6 HOURS PRN
Status: DISCONTINUED | OUTPATIENT
Start: 2024-03-16 | End: 2024-03-16

## 2024-03-16 RX ORDER — SODIUM CHLORIDE 0.9 % (FLUSH) 0.9 %
5-40 SYRINGE (ML) INJECTION PRN
Status: DISCONTINUED | OUTPATIENT
Start: 2024-03-16 | End: 2024-03-18 | Stop reason: HOSPADM

## 2024-03-16 RX ORDER — METHYLERGONOVINE MALEATE 0.2 MG/ML
200 INJECTION INTRAVENOUS PRN
Status: DISCONTINUED | OUTPATIENT
Start: 2024-03-16 | End: 2024-03-16

## 2024-03-16 RX ORDER — DOCUSATE SODIUM 100 MG/1
100 CAPSULE, LIQUID FILLED ORAL 2 TIMES DAILY
Status: DISCONTINUED | OUTPATIENT
Start: 2024-03-16 | End: 2024-03-16

## 2024-03-16 RX ORDER — ONDANSETRON 4 MG/1
4 TABLET, ORALLY DISINTEGRATING ORAL EVERY 6 HOURS PRN
Status: DISCONTINUED | OUTPATIENT
Start: 2024-03-16 | End: 2024-03-16

## 2024-03-16 RX ORDER — SODIUM CHLORIDE, SODIUM LACTATE, POTASSIUM CHLORIDE, AND CALCIUM CHLORIDE .6; .31; .03; .02 G/100ML; G/100ML; G/100ML; G/100ML
500 INJECTION, SOLUTION INTRAVENOUS PRN
Status: DISCONTINUED | OUTPATIENT
Start: 2024-03-16 | End: 2024-03-16

## 2024-03-16 RX ORDER — ACETAMINOPHEN 500 MG
1000 TABLET ORAL EVERY 6 HOURS
Status: DISCONTINUED | OUTPATIENT
Start: 2024-03-16 | End: 2024-03-18 | Stop reason: HOSPADM

## 2024-03-16 RX ORDER — KETOROLAC TROMETHAMINE 30 MG/ML
30 INJECTION, SOLUTION INTRAMUSCULAR; INTRAVENOUS ONCE
Status: DISCONTINUED | OUTPATIENT
Start: 2024-03-16 | End: 2024-03-17

## 2024-03-16 RX ORDER — TERBUTALINE SULFATE 1 MG/ML
0.25 INJECTION, SOLUTION SUBCUTANEOUS
Status: DISCONTINUED | OUTPATIENT
Start: 2024-03-16 | End: 2024-03-16

## 2024-03-16 RX ADMIN — KETOROLAC TROMETHAMINE 30 MG: 30 INJECTION, SOLUTION INTRAMUSCULAR; INTRAVENOUS at 09:51

## 2024-03-16 RX ADMIN — Medication 166.7 ML: at 09:51

## 2024-03-16 RX ADMIN — IBUPROFEN 600 MG: 600 TABLET, FILM COATED ORAL at 22:18

## 2024-03-16 RX ADMIN — ACETAMINOPHEN 1000 MG: 500 TABLET ORAL at 19:17

## 2024-03-16 RX ADMIN — DOCUSATE SODIUM 50 MG AND SENNOSIDES 8.6 MG 2 TABLET: 8.6; 5 TABLET, FILM COATED ORAL at 22:19

## 2024-03-16 RX ADMIN — LIDOCAINE HYDROCHLORIDE 200 MG: 10 INJECTION, SOLUTION INFILTRATION; PERINEURAL at 09:56

## 2024-03-16 RX ADMIN — SODIUM CHLORIDE, POTASSIUM CHLORIDE, SODIUM LACTATE AND CALCIUM CHLORIDE: 600; 310; 30; 20 INJECTION, SOLUTION INTRAVENOUS at 06:52

## 2024-03-16 RX ADMIN — SODIUM CHLORIDE, PRESERVATIVE FREE 10 ML: 5 INJECTION INTRAVENOUS at 22:19

## 2024-03-16 RX ADMIN — ACETAMINOPHEN 1000 MG: 500 TABLET ORAL at 12:09

## 2024-03-16 RX ADMIN — SODIUM CHLORIDE 5 MILLION UNITS: 900 INJECTION INTRAVENOUS at 06:52

## 2024-03-16 RX ADMIN — IBUPROFEN 600 MG: 600 TABLET, FILM COATED ORAL at 16:20

## 2024-03-16 ASSESSMENT — PAIN DESCRIPTION - DESCRIPTORS
DESCRIPTORS: CRAMPING;SORE
DESCRIPTORS: ACHING;DISCOMFORT
DESCRIPTORS: CRAMPING;SORE
DESCRIPTORS: CRAMPING;SORE

## 2024-03-16 ASSESSMENT — PAIN DESCRIPTION - LOCATION
LOCATION: ABDOMEN

## 2024-03-16 ASSESSMENT — PAIN SCALES - GENERAL
PAINLEVEL_OUTOF10: 2
PAINLEVEL_OUTOF10: 2
PAINLEVEL_OUTOF10: 4
PAINLEVEL_OUTOF10: 3

## 2024-03-16 ASSESSMENT — PAIN DESCRIPTION - ORIENTATION
ORIENTATION: LOWER;MID
ORIENTATION: LOWER;MID

## 2024-03-16 ASSESSMENT — PAIN - FUNCTIONAL ASSESSMENT: PAIN_FUNCTIONAL_ASSESSMENT: ACTIVITIES ARE NOT PREVENTED

## 2024-03-16 NOTE — FLOWSHEET NOTE
Patient assisted out of tub, up to bathroom.  Midwife and RN at bedside.  Bloody show noted.  Patient breathing well through contractions.

## 2024-03-16 NOTE — FLOWSHEET NOTE
RN and Midwife at bedside throughout pushing and second stage of labor.  EFM continuously monitored at bedside.  Signal intermittent at times with pushing and low station in pelvis.  Continually adjusted, provider at bedside and aware.  Pulse ox placed on patient.

## 2024-03-16 NOTE — FLOWSHEET NOTE
Patient admitted to room 744 from L&D via wheelchair.   Oriented to room and surroundings.  Plan of care reviewed.  Verbalized understanding.  Instructed on infant security and safe sleep practices.  Preventing falls education provided .The following handouts given: A New Beginning: Your Guide to Postpartum Care, Rounding, gs Security System,Babies Cry A lot, Safe Sleep, Security and Visitation Guidelines.   Call light placed within reach.

## 2024-03-16 NOTE — CARE COORDINATION
CASE MANAGEMENT POST-PARTUM TRANSITIONAL CARE PLAN    Normal labor [O80, Z37.9]    OB Provider: YOSVANY Boomer    Writer met w/ Elva and her  Harvey at her bedside to discuss DCP. She is S/P  on 3/16/24 at 40w3d at 0942 of male infant    Writer verified address/phone number correct on facesheet. She states she lives with her  and their daughter. She denied barriers with transportation home, to doctors appointments or with paying for medications upon discharge home.     [facility] insurance correct. Writer notified them they have 30 days from date of birth to add  to insurance policy. They verbalized understanding.    They confirmed a safe place for infant to sleep at home.    Infant name on BC: Tba.   Infant PCP: [facility]- Dr. Mallory.     DME: none  HOME CARE: none    Anticipate DC home of couplet in private vehicle in 1-2 days status post vaginal delivery.    Readmission Risk              Risk of Unplanned Readmission:  4

## 2024-03-16 NOTE — DISCHARGE SUMMARY
needed for Pain     senna 8.6 MG tablet  Commonly known as: Senokot  Take 1 tablet by mouth 2 times daily as needed for Constipation            CONTINUE taking these medications      PRENATAL+DHA PO               Where to Get Your Medications        These medications were sent to [facility] - [place], [place] - [address] [facility] - P [phone] - F [phone]  [address] [place], [place] [address] [address]      Phone: [phone]   acetaminophen 500 MG tablet  ibuprofen 600 MG tablet  senna 8.6 MG tablet         Activity: pelvic rest x 6 weeks  Diet: regular diet  Follow up: 2 weeks for  postpartum visit    Condition on discharge: stable    Discharge date: 3/18/24     Selma Regalado DO  Ob/Gyn Resident    Comments:  Home care and follow-up care were reviewed.  Pelvic rest, and birth control were reviewed. Signs and symptoms of mastitis and post partum depression were reviewed. The patient is to notify her physician if any of these occur. The patient was counseled on secondary smoke risks and the increased risk of sudden infant death syndrome and respiratory problems to her baby with exposure. She was counseled on various alternate recommendations to decrease the exposure to secondary smoke to her children.  d

## 2024-03-16 NOTE — FLOWSHEET NOTE
Patient transferred to  Postpartum room 744 per wheelchair, infant in mother's arms.  Bedside report given to Kristen GOINS and Deloris GOINS.  Infant bands verified.  Fundal height verified.

## 2024-03-16 NOTE — H&P
[facility] Obstetrics History and Physical  2024  6:39 AM    SUBJECTIVE:    CHIEF COMPLAINT:  contractions    HISTORY OF PRESENT ILLNESS:      The patient is a 38 y.o. female at 40w3d.    Elva presents with a chief complaint as above and is being admitted for spontaneous labor.     Course of pregnancy complicated by:     Patient Active Problem List   Diagnosis    History of loop electrosurgical excision procedure (LEEP) of cervix affecting pregnancy, antepartum    High-risk pregnancy, multigravida of advanced maternal age, antepartum    GBS bacteriuria    RECEIVED flu shot    Low-lying placenta (RESOLVED)    Tdap vaccination: RECEIVED    Rh+/RI/GBSbacteriuria       OBJECTIVE:     Estimated Due Date:   Estimated Date of Delivery: 3/13/24   Patient's last menstrual period was 2023.    Confirmed by: Early US at 9 weeks    PRENATAL LABS:    Blood Type/Rh: A pos  Antibody Screen: negative  Hemoglobin, Hematocrit, Platelets: 12.1 / 36.7 / 262  Rubella: immune  T. Pallidum, IgG: non-reactive   Hepatitis B Surface Antigen: non-reactive   HIV: non-reactive   Sickle Cell Screen: not done  Gonorrhea: negative  Chlamydia: negative  Urine culture: positive for GBS, date: 2023    1 hour Glucose Tolerance Test: 102  3 hour Glucose Tolerance Test: n/a    Group B Strep: positive  Cystic Fibrosis Screen: not done  First Trimester Screen: patient declined  MSAFP/Multiple Markers: patient declined  Non-Invasive Prenatal Testing: patient declined     Steroids:  no      PAST OB HISTORY:  OB History    Para Term  AB Living   3 1 1 0 1 1   SAB IAB Ectopic Molar Multiple Live Births   1 0 0 0 0 1      # Outcome Date GA Lbr Misbah/2nd Weight Sex Delivery Anes PTL Lv   3 Current            2 Term 17 39w2d 04:56 / 04:01 2.8 kg (6 lb 2.8 oz) F Vag-Spont EPI N YASMANY      Birth Comments: baby had 2 week NICU- stay for Sepsis- Satph infection in the bloodstream      Name: STONE LUO

## 2024-03-16 NOTE — CARE COORDINATION
ANTEPARTUM NOTE    Normal labor [O80, Z37.9]    Elva was admitted to L&D on 3/16/24 for contractions/spontaneous labor @ 40w3d    OB GYN Provider: MMW    Will meet with patient after delivery to verify name/address/phone/insurance and discuss discharge planning.     Anticipate DC home 2 nights after vaginal delivery or 4 nights after C/S delivery as long as hemodynamically stable.

## 2024-03-16 NOTE — CONSULTS
INPATIENT CONSULT    Maternal /para status:     Maternal breastfeeding history:  Last child 6 years ago, nursed some for 3 months but not exclusively       Current pregnancy:    Gestational age: 40.3 weeks     C/section or vaginal delivery: vaginal      Birth weight: 8.4lb (3750g)      SGA/LGA/IUGR/diabetes during pregnancy:      Plan for feeding: Breast      Breast pump at home: Yes, from 6 years ago, signed medical necessity form given         Assessment of breastfeeding: nursed well in recovery for 45 minutes           Reviewed:   - Breastfeeding packet  - Expectations for normal  feeding   - Hand expression  - Deep latch/milk transfer  - Cues for feeding (early/late)     Encouraged:   - Frequent skin to skin with mom or dad  - Frequent attempts to feed  - Calling for assistance as needed

## 2024-03-16 NOTE — L&D DELIVERY NOTE
SterlingSaeed Elva [[id number]]      Labor Events     Labor: No   Steroids: None  Cervical Ripening Date/Time:      Antibiotics Received during Labor: Yes  Rupture Identifier: Sac 1  Rupture Date/Time:  3/16/24 08:28:00   Rupture Type: AROM  Fluid Color: Meconium  Fluid Odor: None  Fluid Volume: Moderate  Augmentation: AROM              Anesthesia    Method: None       Labor Event Times      Labor onset date/time:  3/16/24 08:28:00     Dilation complete date/time:        Start pushing date/time:     Decision date/time (emergent ):            Delivery Details      Delivery Date: 3/16/24 Delivery Time: 09:42:00   Delivery Type: Vaginal, Spontaneous               Presentation    Presentation: Vertex  Position: Left  _: Occiput  _: Anterior       Shoulder Dystocia    Shoulder Dystocia Present?: No       Assisted Delivery Details    Forceps Attempted?: No  Vacuum Extractor Attempted?: No                           Cord    Complications: None  Delayed Cord Clamping?: Yes  Cord Clamped Date/Time: 3/16/2024 09:47:51              Placenta    Date/Time: 3/16/2024 09:50:00  Removal: Spontaneous  Appearance: Intact  Disposition: Pathology       Lacerations    Episiotomy: None  Perineal Lacerations: 2nd  Other Lacerations: no non-perineal laceration  Number of Repair Packets: 2       Vaginal Counts    Initial Count Personnel: LIZ  Initial Count Verified By: LUDY  Intial Sponge Count: Correct Intial Needles Count: Correct Intial Instruments Count: Correct   Final Sponges Count: Correct Final Needles  Count: Correct Final Instruments Count: Correct   Final Count Personnel: LUDY  Final Count Verified By: MACK  Accurate Final Count?: Yes       Blood Loss  Mother: Elva Katz #[id number]     Start of Mother's Information      Delivery Blood Loss  24 0828 - 24 1028      None                 End of Mother's Information  Mother: Elva Katz #[id number]

## 2024-03-17 PROCEDURE — 2580000003 HC RX 258

## 2024-03-17 PROCEDURE — 6370000000 HC RX 637 (ALT 250 FOR IP)

## 2024-03-17 RX ADMIN — ACETAMINOPHEN 1000 MG: 500 TABLET ORAL at 09:09

## 2024-03-17 RX ADMIN — IBUPROFEN 600 MG: 600 TABLET, FILM COATED ORAL at 13:14

## 2024-03-17 RX ADMIN — SODIUM CHLORIDE, PRESERVATIVE FREE 10 ML: 5 INJECTION INTRAVENOUS at 09:08

## 2024-03-17 RX ADMIN — ACETAMINOPHEN 1000 MG: 500 TABLET ORAL at 02:50

## 2024-03-17 RX ADMIN — SODIUM CHLORIDE, PRESERVATIVE FREE 10 ML: 5 INJECTION INTRAVENOUS at 20:31

## 2024-03-17 RX ADMIN — ACETAMINOPHEN 1000 MG: 500 TABLET ORAL at 17:28

## 2024-03-17 RX ADMIN — IBUPROFEN 600 MG: 600 TABLET, FILM COATED ORAL at 05:42

## 2024-03-17 RX ADMIN — ACETAMINOPHEN 1000 MG: 500 TABLET ORAL at 23:59

## 2024-03-17 RX ADMIN — DOCUSATE SODIUM 50 MG AND SENNOSIDES 8.6 MG 2 TABLET: 8.6; 5 TABLET, FILM COATED ORAL at 09:08

## 2024-03-17 RX ADMIN — IBUPROFEN 600 MG: 600 TABLET, FILM COATED ORAL at 19:15

## 2024-03-17 ASSESSMENT — PAIN DESCRIPTION - DESCRIPTORS
DESCRIPTORS: CRAMPING
DESCRIPTORS: DISCOMFORT;SORE
DESCRIPTORS: CRAMPING
DESCRIPTORS: DISCOMFORT;CRAMPING
DESCRIPTORS: CRAMPING

## 2024-03-17 ASSESSMENT — PAIN DESCRIPTION - ORIENTATION
ORIENTATION: LOWER;MID
ORIENTATION: LOWER;MID
ORIENTATION: MID
ORIENTATION: MID;LOWER
ORIENTATION: LOWER;MID

## 2024-03-17 ASSESSMENT — PAIN SCALES - GENERAL
PAINLEVEL_OUTOF10: 2
PAINLEVEL_OUTOF10: 1
PAINLEVEL_OUTOF10: 2
PAINLEVEL_OUTOF10: 1
PAINLEVEL_OUTOF10: 2
PAINLEVEL_OUTOF10: 1
PAINLEVEL_OUTOF10: 2

## 2024-03-17 ASSESSMENT — PAIN DESCRIPTION - LOCATION
LOCATION: ABDOMEN

## 2024-03-17 ASSESSMENT — PAIN - FUNCTIONAL ASSESSMENT
PAIN_FUNCTIONAL_ASSESSMENT: ACTIVITIES ARE NOT PREVENTED
PAIN_FUNCTIONAL_ASSESSMENT: ACTIVITIES ARE NOT PREVENTED

## 2024-03-17 NOTE — LACTATION NOTE
In to assist pt with latch. Baby is too sleepy at this time post circumcision. Reviewed hand expression and technique. Educational video provided on hand expression. Encouraged hand expression of colostrum for baby if he is not latching and feeding well.

## 2024-03-17 NOTE — LACTATION NOTE
Pt states breastfeeding is going well so far, but she did have some nipple tenderness that just started last feed. Reviewed deep latch, hunger cues, and frequency of feedings. Assisted with flange fitting, gave 21 mm flanges. Pt has medela pump at home. Also assisted pt with latching. Adjusted positioning and technique to achieve a deeper latch. Nipple tenderness likely secondary to shallow latch. Mother able to latch deeply with verbal instruction and expresses comfort with this latch. Reviewed how to do breast compressions if needed.

## 2024-03-18 VITALS
RESPIRATION RATE: 16 BRPM | OXYGEN SATURATION: 99 % | TEMPERATURE: 98.6 F | SYSTOLIC BLOOD PRESSURE: 114 MMHG | HEART RATE: 67 BPM | DIASTOLIC BLOOD PRESSURE: 76 MMHG

## 2024-03-18 PROCEDURE — 6370000000 HC RX 637 (ALT 250 FOR IP)

## 2024-03-18 PROCEDURE — 59400 OBSTETRICAL CARE: CPT

## 2024-03-18 RX ADMIN — IBUPROFEN 600 MG: 600 TABLET, FILM COATED ORAL at 07:01

## 2024-03-18 RX ADMIN — ACETAMINOPHEN 1000 MG: 500 TABLET ORAL at 07:01

## 2024-03-18 RX ADMIN — DOCUSATE SODIUM 50 MG AND SENNOSIDES 8.6 MG 2 TABLET: 8.6; 5 TABLET, FILM COATED ORAL at 10:33

## 2024-03-18 RX ADMIN — IBUPROFEN 600 MG: 600 TABLET, FILM COATED ORAL at 00:59

## 2024-03-18 ASSESSMENT — PAIN SCALES - GENERAL: PAINLEVEL_OUTOF10: 1

## 2024-03-18 NOTE — CARE COORDINATION
Social Work     Sw reviewed medical record (current active problem list) and tox screens and found no current concerns.     Sw spoke with mom briefly to explain Sw role, inquire if any needs or concerns, and provide safe sleep education and discuss.  Mom denied any needs or questions and informs baby has a safe sleep environment (bassinet).     Mom denied any current s/s of anxiety or depression and is aware to reach out to OB if any s/s occur after dc.    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       Mom reports a really good support system with both sides of family, family is providing a meal train and helping with mom's 6 year old also, and denied any current questions or needs.      Mom reports this is her 2nd child (6 year old daughter) excited for baby.       Mom states ped will be [facility].      Sw encouraged mom to reach out if any issues or concerns arise.

## 2024-03-18 NOTE — FLOWSHEET NOTE
I have reviewed all AWHON[facility] Post-Birth Warning Signs and essential teaching points for pulmonary embolism, cardiac disease, hypertensive disorders of pregnancy, obstetric hemorrhage, venous thromboembolism, infection, and postpartum depression with the patient and spouse (support person) . I have informed the patient on when to call their healthcare provider and when to call 911. I have discussed with the patient  the importance of scheduling a follow-up visit with their physician, nurse practitioner or midwife and provided them with correct contact information for appointment. I have provided the patient with a copy of the \"Save Your Life\" handout.

## 2024-03-18 NOTE — PLAN OF CARE
Problem: Discharge Planning  Goal: Discharge to home or other facility with appropriate resources  3/18/2024 1140 by Kristen Cruz RN  Outcome: Completed  3/18/2024 034 by Allyson Cardenas RN  Outcome: Progressing     Problem: Pain  Goal: Verbalizes/displays adequate comfort level or baseline comfort level  3/18/2024 1140 by Kristen Cruz RN  Outcome: Completed  3/18/2024 034 by Allyson Cardenas RN  Outcome: Progressing     Problem: Postpartum  Goal: Experiences normal postpartum course  Description:  Postpartum OB-Pregnancy care plan goal which identifies if the mother is experiencing a normal postpartum course  3/18/2024 1140 by Kristen Cruz RN  Outcome: Completed  3/18/2024 0349 by Allyson Cardenas RN  Outcome: Progressing  Goal: Appropriate maternal -  bonding  Description:  Postpartum OB-Pregnancy care plan goal which identifies if the mother and  are bonding appropriately  3/18/2024 1140 by Kristen Cruz RN  Outcome: Completed  3/18/2024 034 by Allyson Cardenas RN  Outcome: Progressing  Goal: Establishment of infant feeding pattern  Description:  Postpartum OB-Pregnancy care plan goal which identifies if the mother is establishing a feeding pattern with their   3/18/2024 1140 by Kristen Cruz RN  Outcome: Completed  3/18/2024 0349 by Allyson Cardenas RN  Outcome: Progressing  Goal: Incisions, wounds, or drain sites healing without S/S of infection  3/18/2024 1140 by Kristen Cruz RN  Outcome: Completed  3/18/2024 034 by Allyson Cardenas RN  Outcome: Progressing     Problem: Infection - Adult  Goal: Absence of infection at discharge  3/18/2024 1140 by Kristen Cruz RN  Outcome: Completed  3/18/2024 034 by Allyson Cardenas RN  Outcome: Progressing  Goal: Absence of infection during hospitalization  3/18/2024 1140 by Kristen Cruz RN  Outcome: Completed  3/18/2024 0349 by Allyson Cardenas RN  Outcome: Progressing  Goal: Absence of fever/infection during anticipated

## 2024-03-18 NOTE — LACTATION NOTE
Baby latched with strong sustained suck in football hold.  Mom reports latch is more comfortable.  Reviewed discharge instructions and LC follow up.

## 2024-03-18 NOTE — DISCHARGE INSTRUCTIONS
Follow-up with your OB doctor in 2 weeks or as specified by your physician.     Please refer to A NEW BEGINNING- YOUR PERSONAL GUIDE TO POSTPARTUM CARE book provided in your room. Please take this book home with you to refer to.    For Breastfeeding moms, you can contact our lactation specialist,  with any problems or questions you may have.  Phone number [phone]. Feel free to leave voice mail and your call will be returned as soon as possible.     DIET  Eat a well balanced diet focusing on foods high in fiber and protein.   Drink 8-10 glasses of fluids daily, especially water.  To avoid constipation you may take a mild stool softener as recommended by your doctor or midwife.    ACTIVITY  Gradually increase your activity.  Resume exercise regimen only after advise by your doctor or midwife.  Avoid lifting anything heavier than your baby or a gallon of milk for SIX weeks.   Avoid driving 1 week for vaginal delivery and 2 weeks for  section, unless otherwise instructed by physician.  Rise slowly from a lying to sitting and then a standing position.  Climb stairs carefully.  Use caution when carrying your baby up and down the stairs.  NO SEXUAL Activity for 6 weeks or until advised by your doctor; Nothing in vagina: intercourse, tampons, or douching.  Be prepared to discuss family planning at your follow-up OB visit.   You may feel tired or have a lack of energy.  You may continue your prenatal vitamin to replenish nutrients post delivery.  Nap when baby naps to catch up on sleep.   May shower, NO tub baths, swimming, or hot tubs.    EMOTIONS  You may feel cardoso, sad, teary, & overwhelmed for the first 2 weeks postpartum.  Contact your OB provider if you feel you may be showing signs of postpartum depression, or have thoughts of harming yourself or your infant.  If infant will not stop crying, contact another adult for help or place infant in their crib on their back and take a break.  NEVER shake your

## 2024-03-18 NOTE — PLAN OF CARE
Problem: Discharge Planning  Goal: Discharge to home or other facility with appropriate resources  Outcome: Progressing     Problem: Pain  Goal: Verbalizes/displays adequate comfort level or baseline comfort level  Outcome: Progressing     Problem: Postpartum  Goal: Experiences normal postpartum course  Description:  Postpartum OB-Pregnancy care plan goal which identifies if the mother is experiencing a normal postpartum course  Outcome: Progressing  Goal: Appropriate maternal -  bonding  Description:  Postpartum OB-Pregnancy care plan goal which identifies if the mother and  are bonding appropriately  Outcome: Progressing  Goal: Establishment of infant feeding pattern  Description:  Postpartum OB-Pregnancy care plan goal which identifies if the mother is establishing a feeding pattern with their   Outcome: Progressing  Goal: Incisions, wounds, or drain sites healing without S/S of infection  Outcome: Progressing     Problem: Infection - Adult  Goal: Absence of infection at discharge  Outcome: Progressing  Goal: Absence of infection during hospitalization  Outcome: Progressing  Goal: Absence of fever/infection during anticipated neutropenic period  Outcome: Progressing     Problem: Safety - Adult  Goal: Free from fall injury  Outcome: Progressing

## 2024-03-18 NOTE — PROGRESS NOTES
CLINICAL PHARMACY NOTE: MEDS TO BEDS    Total # of Prescriptions Filled: 3   The following medications were delivered to the patient:  Senna 8.6mg tabs  Acetaminophen 500mg tabs  Ibuprofen 600mg tabs    Additional Documentation:  Delivered to pt + 1 in room 744 on 3/18 at 10:21A. Copay was $7.66 paid with [employer]  
[facility] Labor Note    SUBJECTIVE:    Patient is working well with labor. Patient was in tub, requesting to get out to void. CNM helped patient out of the tub and to toilet. Bloody show was noted. CNM helped patient to bed for SVE, patient desiring AROM.      OBJECTIVE:     VS:  oral temperature is 98.2 °F (36.8 °C). Her blood pressure is 130/82 and her pulse is 72. Her respiration is 16 and oxygen saturation is 98%.     FHT:    Baseline: 130   Variability: moderate              Accels: present   Decels: Early    Scalp electrode: No    Contraction pattern:                                  Frequency:                                  Duration: 2-3                                 Intensity: Firm                                 Pitocin: No                                 IUPC:  No    Cervix:             Dilation: 7            Effacement: 90            Station: -1            Position: Mid            Consistency: Soft    Status of membranes: AROM, large amount of fluid (meconium fluid). Patient educated on meconium fluid, discusssed NICU team at delivery. Patient verbalized understanding.     Pain control: Non-pharmacological methods.     Maternal status (hydration, fatigue, voids): Patient is voiding as needed, drinking water often. Was snacking on crackers previously.      ASSESSMENT/PLAN:  Elva Katz is a 38 y.o. female  at 40w3d in Spontaneous Labor              - Pen G for GBS prophylaxis              - Anatomy US: Posterior/3VC/Normal Anatomy               - VSS               - cEFM and TOCO              - Cat 1 FHT and TOCO showing contractions               - IV saline lock   - meconium fluid, NICU aware and updated              - Continue expectant management               - Residents updated and involved   - Dr. Wiley updated   - Coping well with labor  - Continue support  - FHR: Category 1    JEAN Pyle-YOSVANY    
OB/GYN MMW Resident Involvement Note     Date: 3/16/2024       Time: 6:36 AM   Patient Name: Elva Katz     Patient : 1985  Room/Bed: [facility]/0708-    Admission Date/Time: 3/16/2024  6:13 AM      HPI: Elva Katz is a 38 y.o.  at 40w3d who presents in Spontaneous Labor. The patient reports fetal movement is present, complains of contractions, denies loss of fluid, denies vaginal bleeding.  Patient denies RUQ pain, nausea, vomitting, vision changes, HA, fever, chills.    Patients pregnancy is complicated by GBS bacteriuria.    DATING:  LMP: Patient's last menstrual period was 2023.  Estimated Date of Delivery: 3/13/24   Based on: LMP    PREGNANCY RISK FACTORS:  Patient Active Problem List   Diagnosis    History of loop electrosurgical excision procedure (LEEP) of cervix affecting pregnancy, antepartum    High-risk pregnancy, multigravida of advanced maternal age, antepartum    GBS bacteriuria    RECEIVED flu shot    Low-lying placenta (RESOLVED)    Tdap vaccination: RECEIVED    Rh+/RI/GBSbacteriuria        Steroids Given In This Pregnancy:  no     REVIEW OF SYSTEMS:   Constitutional: negative fever, negative chills, negative weight changes   HEENT: negative visual disturbances, negative headaches, negative dizziness, negative hearing loss  Breast: Negative breast abnormalities, negative breast lumps, negative nipple discharge  Respiratory: negative dyspnea, negative cough, negative SOB  Cardiovascular: negative chest pain,  negative palpitations, negative arrhythmia, negative syncope   Gastrointestinal: negative abdominal pain, negative RUQ pain, negative N/V, negative diarrhea, negative constipation, negative bowel changes, negative heartburn   Genitourinary: positive contractions, negative dysuria, negative hematuria, negative urinary incontinence, negative vaginal discharge, negative vaginal bleeding or spotting  Dermatological: negative rash, negative pruritis, negative 
Obstetric/Gynecology Resident Interval Note    Uncomplicated  with Nimo Dwyer CNM. After delivery of placenta by CNM, the vagina was swept of all clots and debris. A 2nd degree laceration noted after delivery. Toradol x1 given. Lidocaine 1% injected at site of delivery. Repaired in standard fashion with 3 0 Vicryl. Patient tolerated well.    Senior resident present for repair.    Maria R Mendoza MD  OB/GYN Resident, PGY1  [place], [place]  3/16/2024, 10:26 AM  
POST PARTUM DAY # 1    Elva Katz is a 38 y.o. female  This patient was seen & examined today.  3/16/24    Her pregnancy was complicated by:   Patient Active Problem List   Diagnosis    History of loop electrosurgical excision procedure (LEEP) of cervix affecting pregnancy, antepartum    High-risk pregnancy, multigravida of advanced maternal age, antepartum    GBS bacteriuria    RECEIVED flu shot    Low-lying placenta (RESOLVED)    Tdap vaccination: RECEIVED    Rh+/RI/GBSbacteriuria    Normal labor     3/16/24 M Apg 8/9 Wt 8#4       Today she is doing well without any chief complaint. Her lochia is light. She denies chest pain, shortness of breath, headache, and blurred vision. She is  bottle feeding and she denies any breast tenderness. She is ambulating well. Her voiding pattern is normal. I reviewed signs and symptoms of post partum depression with the patient, she currently denies any of these symptoms. She is tolerating solids.     Vital Signs:  Vitals:    24 1215 24 1245 24 1616 24 2153   BP: 123/77 121/72 133/84 117/65   Pulse: 85 62 86 78   Resp: 16 16 16 15   Temp: 98.5 °F (36.9 °C) 99.1 °F (37.3 °C) 99.1 °F (37.3 °C)    TempSrc: Oral Oral Oral    SpO2: 98% 99% 99% 98%         Physical Exam:  General:  no apparent distress, alert, and cooperative  Neurologic:  alert, oriented, normal speech, no focal findings or movement disorder noted  Lungs:  no increased work of breathing, no conversational dyspnea  Heart:  regular rate   Abdomen: abdomen soft, non-distended, appropriately tender  Fundus: non-tender, normal size, firm, below umbilicus  Extremities:  no calf tenderness, non edematous    Lab:  Lab Results   Component Value Date    HGB 13.3 2024     Lab Results   Component Value Date    HCT 38.9 2024       Assessment/Plan:  Elva Katz is a  PPD # 1 s/p    - Doing well, VSS   - male infant in General Care Nursery, circumcision desired and 
POST PARTUM DAY #2     Elva Katz is a 38 y.o. female  This patient was seen & examined today.     Her pregnancy was complicated by:   Patient Active Problem List   Diagnosis    History of loop electrosurgical excision procedure (LEEP) of cervix affecting pregnancy, antepartum    High-risk pregnancy, multigravida of advanced maternal age, antepartum    GBS bacteriuria    RECEIVED flu shot    Low-lying placenta (RESOLVED)    Tdap vaccination: RECEIVED    Rh+/RI/GBSbacteriuria    Normal labor     3/16/24 M Apg 8/9 Wt 8#4       Today she is doing well without any chief complaint. Her lochia is light. She denies chest pain, shortness of breath, headache, and blurred vision. She is bottle feeding and she denies any breast tenderness. She is ambulating well. Her voiding pattern is normal. I reviewed signs and symptoms of post partum depression with the patient, she currently denies any of these symptoms. She is tolerating solids.     Vital Signs:  Vitals:    24 0810 24 1606 24 2031 24 0340   BP: 107/66 128/74 (!) 124/51 116/69   Pulse: 77 73 72 65   Resp: 16  16 16   Temp: 98.1 °F (36.7 °C) 98.3 °F (36.8 °C) 98.4 °F (36.9 °C) 97.9 °F (36.6 °C)   TempSrc: Oral Oral Oral Oral   SpO2: 99% 100% 99%        Physical Exam:  General:  no apparent distress, alert, and cooperative  Neurologic:  alert, oriented, normal speech, no focal findings or movement disorder noted  Lungs:  No increased work of breathing, good air exchange  Heart:  regular rate and rhythm    Abdomen: abdomen soft, non-distended, non-tender  Fundus: non-tender, normal size, firm, below umbilicus  Extremities:  no calf tenderness, non edematous    Lab:  Lab Results   Component Value Date    HGB 13.3 2024     Lab Results   Component Value Date    HCT 38.9 2024       Assessment/Plan:  Elva Katz is a  PPD # 1 s/p    - Doing well, VSS   - male infant in General Care Nursery, circumcision done   - 
MARIA A Segura  Midwife  3/17/2024  11:56 AM

## 2024-03-19 LAB — SURGICAL PATHOLOGY REPORT: NORMAL

## 2024-03-28 ENCOUNTER — TELEPHONE (OUTPATIENT)
Dept: OBGYN CLINIC | Age: 39
End: 2024-03-28

## 2024-03-28 NOTE — TELEPHONE ENCOUNTER
Spoke with Elva, has residual tenderness.  Located in outer quadrant of breast  Discussed massage and compresses to area  Reviewed signs of mastitis to report including fever, malaise, redness, etc  She will continue with above and was encouraged to page as needed, she would need antibiotics at that time  Has appointment on Wednesday as well

## 2024-03-28 NOTE — TELEPHONE ENCOUNTER
RADHA CNVINICIUS Incoming Phone Call from Current Patient    Patient Request:       Patient Symptoms:patient is almost 2 weeks pp,  Patient stated she has been getting hot and cold. Patient stated her breast had a tender spot and engorged the other night, patient continued to breast feed and the breast did go down but the tender stop was still there             Last provider visit: 3/11/24   2 week pp visit is 4/3/24         Can this concern wait 24-48hrs to be addressed? No

## 2024-03-31 ENCOUNTER — TELEPHONE (OUTPATIENT)
Dept: OBGYN CLINIC | Age: 39
End: 2024-03-31

## 2024-04-03 ENCOUNTER — POSTPARTUM VISIT (OUTPATIENT)
Dept: OBGYN CLINIC | Age: 39
End: 2024-04-03

## 2024-04-03 ENCOUNTER — HOSPITAL ENCOUNTER (OUTPATIENT)
Age: 39
Setting detail: SPECIMEN
Discharge: HOME | End: 2024-04-03

## 2024-04-03 VITALS — WEIGHT: 155 LBS | DIASTOLIC BLOOD PRESSURE: 98 MMHG | BODY MASS INDEX: 28.81 KG/M2 | SYSTOLIC BLOOD PRESSURE: 148 MMHG

## 2024-04-03 DIAGNOSIS — R03.0 ELEVATED BLOOD PRESSURE READING WITHOUT DIAGNOSIS OF HYPERTENSION: Primary | ICD-10-CM

## 2024-04-03 DIAGNOSIS — R03.0 ELEVATED BLOOD PRESSURE READING WITHOUT DIAGNOSIS OF HYPERTENSION: ICD-10-CM

## 2024-04-03 LAB
BASOPHILS # BLD: 0.06 K/UL (ref 0–0.2)
BASOPHILS NFR BLD: 1 % (ref 0–2)
CREAT UR-MCNC: 95.2 MG/DL (ref 28–217)
EOSINOPHIL # BLD: 1.05 K/UL (ref 0–0.44)
EOSINOPHILS RELATIVE PERCENT: 12 % (ref 1–4)
ERYTHROCYTE [DISTWIDTH] IN BLOOD BY AUTOMATED COUNT: 12.5 % (ref 11.8–14.4)
HCT VFR BLD AUTO: 39.5 % (ref 36.3–47.1)
HGB BLD-MCNC: 12.9 G/DL (ref 11.9–15.1)
IMM GRANULOCYTES # BLD AUTO: 0.19 K/UL (ref 0–0.3)
IMM GRANULOCYTES NFR BLD: 2 %
LYMPHOCYTES NFR BLD: 1.64 K/UL (ref 1.1–3.7)
LYMPHOCYTES RELATIVE PERCENT: 19 % (ref 24–43)
MCH RBC QN AUTO: 31.2 PG (ref 25.2–33.5)
MCHC RBC AUTO-ENTMCNC: 32.7 G/DL (ref 28.4–34.8)
MCV RBC AUTO: 95.4 FL (ref 82.6–102.9)
MONOCYTES NFR BLD: 0.59 K/UL (ref 0.1–1.2)
MONOCYTES NFR BLD: 7 % (ref 3–12)
NEUTROPHILS NFR BLD: 59 % (ref 36–65)
NEUTS SEG NFR BLD: 5.29 K/UL (ref 1.5–8.1)
NRBC BLD-RTO: 0 PER 100 WBC
PLATELET # BLD AUTO: 386 K/UL (ref 138–453)
PMV BLD AUTO: 9.8 FL (ref 8.1–13.5)
RBC # BLD AUTO: 4.14 M/UL (ref 3.95–5.11)
TOTAL PROTEIN, URINE: 9 MG/DL
URINE TOTAL PROTEIN CREATININE RATIO: 0.09
WBC OTHER # BLD: 8.8 K/UL (ref 3.5–11.3)

## 2024-04-03 PROCEDURE — 99024 POSTOP FOLLOW-UP VISIT: CPT

## 2024-04-03 NOTE — PROGRESS NOTES
Pt is here for 2 week PP, she does not have any concerns   EPDS - 3  
postpartum visit  Labs were not ordered.  Return to the office in 4 weeks.  She will return for 6 week PP visit  Discussed s/s of mood changes indicating postpartum depression to report.  breast feeding  Signs & Symptoms of mastitis reviewed; notify if occurs  Family planning/birth spacing needs  Family planning counseling was initiated/completed.   Unsure of plans  Non-smoker:  Secondary smoke risks were reviewed, including increased risks of respiratory problems, Sudden Infant Death Syndrome, and potential malignancies.  Smoking cessation counseling: N/A    Elevated BP without dx of hypertension  CMP, CBC and P/C STAT  Denies s/s of PreE  Return tomorrow for BP check  Strict warning signs discussed    Return in about 1 day (around 4/4/2024) for BP check.     The patient, Elva Katz,  was seen with a total time spent of 30 minutes for the [facility] on [facility] date [facility]  The time component involved both face-to-face (counseling and education) and non face-to-face time (care coordination), spent in determining the total time component.      Electronically signed by: JEAN Cervantes CNM

## 2024-04-04 ENCOUNTER — POSTPARTUM VISIT (OUTPATIENT)
Dept: OBGYN CLINIC | Age: 39
End: 2024-04-04

## 2024-04-04 ENCOUNTER — NURSE ONLY (OUTPATIENT)
Dept: OBGYN CLINIC | Age: 39
End: 2024-04-04

## 2024-04-04 ENCOUNTER — HOSPITAL ENCOUNTER (INPATIENT)
Age: 39
LOS: 1 days | Discharge: HOME | End: 2024-04-05
Attending: OBSTETRICS & GYNECOLOGY | Admitting: OBSTETRICS & GYNECOLOGY
Payer: COMMERCIAL

## 2024-04-04 VITALS — SYSTOLIC BLOOD PRESSURE: 158 MMHG | BODY MASS INDEX: 28.81 KG/M2 | DIASTOLIC BLOOD PRESSURE: 98 MMHG | WEIGHT: 155 LBS

## 2024-04-04 VITALS
WEIGHT: 155 LBS | BODY MASS INDEX: 28.52 KG/M2 | DIASTOLIC BLOOD PRESSURE: 94 MMHG | HEIGHT: 62 IN | SYSTOLIC BLOOD PRESSURE: 158 MMHG

## 2024-04-04 LAB
ALBUMIN SERPL-MCNC: 3.8 G/DL (ref 3.5–5.2)
ALBUMIN SERPL-MCNC: 3.9 G/DL (ref 3.5–5.2)
ALBUMIN/GLOB SERPL: 1 {RATIO} (ref 1–2.5)
ALBUMIN/GLOB SERPL: 2 {RATIO} (ref 1–2.5)
ALP SERPL-CCNC: 105 U/L (ref 35–104)
ALP SERPL-CCNC: 96 U/L (ref 35–104)
ALT SERPL-CCNC: 16 U/L (ref 10–35)
ALT SERPL-CCNC: 30 U/L (ref 10–35)
ANION GAP SERPL CALCULATED.3IONS-SCNC: 11 MMOL/L (ref 9–16)
ANION GAP SERPL CALCULATED.3IONS-SCNC: 13 MMOL/L (ref 9–16)
AST SERPL-CCNC: 19 U/L (ref 10–35)
AST SERPL-CCNC: 23 U/L (ref 10–35)
BILIRUB SERPL-MCNC: 0.2 MG/DL (ref 0–1.2)
BILIRUB SERPL-MCNC: 0.2 MG/DL (ref 0–1.2)
BUN SERPL-MCNC: 13 MG/DL (ref 6–20)
BUN SERPL-MCNC: 16 MG/DL (ref 6–20)
CALCIUM SERPL-MCNC: 9 MG/DL (ref 8.6–10.4)
CALCIUM SERPL-MCNC: 9.1 MG/DL (ref 8.6–10.4)
CHLORIDE SERPL-SCNC: 106 MMOL/L (ref 98–107)
CHLORIDE SERPL-SCNC: 108 MMOL/L (ref 98–107)
CO2 SERPL-SCNC: 23 MMOL/L (ref 20–31)
CO2 SERPL-SCNC: 23 MMOL/L (ref 20–31)
CREAT SERPL-MCNC: 0.8 MG/DL (ref 0.5–0.9)
CREAT SERPL-MCNC: 0.8 MG/DL (ref 0.5–0.9)
CREAT UR-MCNC: 78 MG/DL (ref 28–217)
ERYTHROCYTE [DISTWIDTH] IN BLOOD BY AUTOMATED COUNT: 12.3 % (ref 11.8–14.4)
GFR SERPL CREATININE-BSD FRML MDRD: >90 ML/MIN/1.73M2
GFR SERPL CREATININE-BSD FRML MDRD: >90 ML/MIN/1.73M2
GLUCOSE SERPL-MCNC: 88 MG/DL (ref 74–99)
GLUCOSE SERPL-MCNC: 97 MG/DL (ref 74–99)
HCT VFR BLD AUTO: 40.4 % (ref 36.3–47.1)
HGB BLD-MCNC: 13.1 G/DL (ref 11.9–15.1)
MCH RBC QN AUTO: 31.2 PG (ref 25.2–33.5)
MCHC RBC AUTO-ENTMCNC: 32.4 G/DL (ref 28.4–34.8)
MCV RBC AUTO: 96.2 FL (ref 82.6–102.9)
NRBC BLD-RTO: 0 PER 100 WBC
PLATELET # BLD AUTO: 375 K/UL (ref 138–453)
PMV BLD AUTO: 8.8 FL (ref 8.1–13.5)
POTASSIUM SERPL-SCNC: 3.6 MMOL/L (ref 3.7–5.3)
POTASSIUM SERPL-SCNC: 4.6 MMOL/L (ref 3.7–5.3)
PROT SERPL-MCNC: 6.5 G/DL (ref 6.6–8.7)
PROT SERPL-MCNC: 6.6 G/DL (ref 6.6–8.7)
RBC # BLD AUTO: 4.2 M/UL (ref 3.95–5.11)
SODIUM SERPL-SCNC: 142 MMOL/L (ref 136–145)
SODIUM SERPL-SCNC: 142 MMOL/L (ref 136–145)
TOTAL PROTEIN, URINE: 35 MG/DL
URINE TOTAL PROTEIN CREATININE RATIO: 0.45
WBC OTHER # BLD: 7.5 K/UL (ref 3.5–11.3)

## 2024-04-04 PROCEDURE — 2580000003 HC RX 258: Performed by: STUDENT IN AN ORGANIZED HEALTH CARE EDUCATION/TRAINING PROGRAM

## 2024-04-04 PROCEDURE — 84156 ASSAY OF PROTEIN URINE: CPT

## 2024-04-04 PROCEDURE — 6360000002 HC RX W HCPCS: Performed by: STUDENT IN AN ORGANIZED HEALTH CARE EDUCATION/TRAINING PROGRAM

## 2024-04-04 PROCEDURE — G8419 CALC BMI OUT NRM PARAM NOF/U: HCPCS | Performed by: ADVANCED PRACTICE MIDWIFE

## 2024-04-04 PROCEDURE — 80053 COMPREHEN METABOLIC PANEL: CPT

## 2024-04-04 PROCEDURE — 36415 COLL VENOUS BLD VENIPUNCTURE: CPT

## 2024-04-04 PROCEDURE — 99024 POSTOP FOLLOW-UP VISIT: CPT | Performed by: ADVANCED PRACTICE MIDWIFE

## 2024-04-04 PROCEDURE — 82570 ASSAY OF URINE CREATININE: CPT

## 2024-04-04 PROCEDURE — 1220000000 HC SEMI PRIVATE OB R&B

## 2024-04-04 PROCEDURE — 1111F DSCHRG MED/CURRENT MED MERGE: CPT | Performed by: ADVANCED PRACTICE MIDWIFE

## 2024-04-04 PROCEDURE — 6370000000 HC RX 637 (ALT 250 FOR IP): Performed by: STUDENT IN AN ORGANIZED HEALTH CARE EDUCATION/TRAINING PROGRAM

## 2024-04-04 PROCEDURE — 1036F TOBACCO NON-USER: CPT | Performed by: ADVANCED PRACTICE MIDWIFE

## 2024-04-04 PROCEDURE — G0378 HOSPITAL OBSERVATION PER HR: HCPCS

## 2024-04-04 PROCEDURE — G8427 DOCREV CUR MEDS BY ELIG CLIN: HCPCS | Performed by: ADVANCED PRACTICE MIDWIFE

## 2024-04-04 PROCEDURE — 85027 COMPLETE CBC AUTOMATED: CPT

## 2024-04-04 RX ORDER — SODIUM CHLORIDE, SODIUM LACTATE, POTASSIUM CHLORIDE, CALCIUM CHLORIDE 600; 310; 30; 20 MG/100ML; MG/100ML; MG/100ML; MG/100ML
INJECTION, SOLUTION INTRAVENOUS CONTINUOUS
Status: DISCONTINUED | OUTPATIENT
Start: 2024-04-04 | End: 2024-04-05

## 2024-04-04 RX ORDER — CALCIUM GLUCONATE 94 MG/ML
1000 INJECTION, SOLUTION INTRAVENOUS PRN
Status: DISCONTINUED | OUTPATIENT
Start: 2024-04-04 | End: 2024-04-06 | Stop reason: HOSPADM

## 2024-04-04 RX ORDER — SODIUM CHLORIDE 0.9 % (FLUSH) 0.9 %
5-40 SYRINGE (ML) INJECTION EVERY 12 HOURS SCHEDULED
Status: DISCONTINUED | OUTPATIENT
Start: 2024-04-04 | End: 2024-04-06 | Stop reason: HOSPADM

## 2024-04-04 RX ORDER — MAGNESIUM SULFATE HEPTAHYDRATE 40 MG/ML
4000 INJECTION, SOLUTION INTRAVENOUS ONCE
Status: COMPLETED | OUTPATIENT
Start: 2024-04-04 | End: 2024-04-04

## 2024-04-04 RX ORDER — LABETALOL HYDROCHLORIDE 5 MG/ML
20 INJECTION, SOLUTION INTRAVENOUS ONCE
Status: COMPLETED | OUTPATIENT
Start: 2024-04-04 | End: 2024-04-04

## 2024-04-04 RX ORDER — LABETALOL HYDROCHLORIDE 5 MG/ML
20 INJECTION, SOLUTION INTRAVENOUS
Status: ACTIVE | OUTPATIENT
Start: 2024-04-04 | End: 2024-04-05

## 2024-04-04 RX ORDER — SODIUM CHLORIDE 9 MG/ML
INJECTION, SOLUTION INTRAVENOUS PRN
Status: DISCONTINUED | OUTPATIENT
Start: 2024-04-04 | End: 2024-04-06 | Stop reason: HOSPADM

## 2024-04-04 RX ORDER — IBUPROFEN 600 MG/1
600 TABLET ORAL EVERY 6 HOURS PRN
Status: DISCONTINUED | OUTPATIENT
Start: 2024-04-04 | End: 2024-04-06 | Stop reason: HOSPADM

## 2024-04-04 RX ORDER — NIFEDIPINE 30 MG/1
30 TABLET, EXTENDED RELEASE ORAL DAILY
Status: DISCONTINUED | OUTPATIENT
Start: 2024-04-04 | End: 2024-04-06 | Stop reason: HOSPADM

## 2024-04-04 RX ORDER — SODIUM CHLORIDE 0.9 % (FLUSH) 0.9 %
5-40 SYRINGE (ML) INJECTION PRN
Status: DISCONTINUED | OUTPATIENT
Start: 2024-04-04 | End: 2024-04-06 | Stop reason: HOSPADM

## 2024-04-04 RX ORDER — BISACODYL 10 MG
10 SUPPOSITORY, RECTAL RECTAL DAILY PRN
Status: DISCONTINUED | OUTPATIENT
Start: 2024-04-04 | End: 2024-04-06 | Stop reason: HOSPADM

## 2024-04-04 RX ORDER — DOCUSATE SODIUM 100 MG/1
100 CAPSULE, LIQUID FILLED ORAL 2 TIMES DAILY
Status: DISCONTINUED | OUTPATIENT
Start: 2024-04-04 | End: 2024-04-06 | Stop reason: HOSPADM

## 2024-04-04 RX ORDER — LANOLIN ALCOHOL/MO/W.PET/CERES
400 CREAM (GRAM) TOPICAL DAILY
Status: DISCONTINUED | OUTPATIENT
Start: 2024-04-04 | End: 2024-04-06 | Stop reason: HOSPADM

## 2024-04-04 RX ORDER — ACETAMINOPHEN 500 MG
1000 TABLET ORAL EVERY 8 HOURS SCHEDULED
Status: DISCONTINUED | OUTPATIENT
Start: 2024-04-04 | End: 2024-04-06 | Stop reason: HOSPADM

## 2024-04-04 RX ORDER — LABETALOL HYDROCHLORIDE 5 MG/ML
40 INJECTION, SOLUTION INTRAVENOUS
Status: ACTIVE | OUTPATIENT
Start: 2024-04-04 | End: 2024-04-05

## 2024-04-04 RX ORDER — LANOLIN 72 %
OINTMENT (GRAM) TOPICAL PRN
Status: DISCONTINUED | OUTPATIENT
Start: 2024-04-04 | End: 2024-04-06 | Stop reason: HOSPADM

## 2024-04-04 RX ORDER — SIMETHICONE 80 MG
80 TABLET,CHEWABLE ORAL EVERY 6 HOURS PRN
Status: DISCONTINUED | OUTPATIENT
Start: 2024-04-04 | End: 2024-04-06 | Stop reason: HOSPADM

## 2024-04-04 RX ORDER — ONDANSETRON 2 MG/ML
4 INJECTION INTRAMUSCULAR; INTRAVENOUS EVERY 4 HOURS PRN
Status: DISCONTINUED | OUTPATIENT
Start: 2024-04-04 | End: 2024-04-06 | Stop reason: HOSPADM

## 2024-04-04 RX ORDER — LABETALOL HYDROCHLORIDE 5 MG/ML
80 INJECTION, SOLUTION INTRAVENOUS
Status: ACTIVE | OUTPATIENT
Start: 2024-04-04 | End: 2024-04-05

## 2024-04-04 RX ORDER — HYDRALAZINE HYDROCHLORIDE 20 MG/ML
10 INJECTION INTRAMUSCULAR; INTRAVENOUS
Status: ACTIVE | OUTPATIENT
Start: 2024-04-04 | End: 2024-04-05

## 2024-04-04 RX ADMIN — DOCUSATE SODIUM 100 MG: 100 CAPSULE, LIQUID FILLED ORAL at 21:11

## 2024-04-04 RX ADMIN — MAGNESIUM SULFATE HEPTAHYDRATE 2000 MG/HR: 40 INJECTION, SOLUTION INTRAVENOUS at 14:31

## 2024-04-04 RX ADMIN — SODIUM CHLORIDE, POTASSIUM CHLORIDE, SODIUM LACTATE AND CALCIUM CHLORIDE: 600; 310; 30; 20 INJECTION, SOLUTION INTRAVENOUS at 13:57

## 2024-04-04 RX ADMIN — IBUPROFEN 600 MG: 600 TABLET, FILM COATED ORAL at 13:00

## 2024-04-04 RX ADMIN — LABETALOL HYDROCHLORIDE 20 MG: 5 INJECTION, SOLUTION INTRAVENOUS at 13:58

## 2024-04-04 RX ADMIN — ACETAMINOPHEN 1000 MG: 500 TABLET ORAL at 13:00

## 2024-04-04 RX ADMIN — ACETAMINOPHEN 1000 MG: 500 TABLET ORAL at 21:11

## 2024-04-04 RX ADMIN — SODIUM CHLORIDE, PRESERVATIVE FREE 10 ML: 5 INJECTION INTRAVENOUS at 13:35

## 2024-04-04 RX ADMIN — NIFEDIPINE 30 MG: 30 TABLET, FILM COATED, EXTENDED RELEASE ORAL at 14:41

## 2024-04-04 RX ADMIN — MAGNESIUM SULFATE HEPTAHYDRATE 4000 MG: 40 INJECTION, SOLUTION INTRAVENOUS at 14:10

## 2024-04-04 RX ADMIN — IBUPROFEN 600 MG: 600 TABLET, FILM COATED ORAL at 19:15

## 2024-04-04 RX ADMIN — MAGNESIUM OXIDE 400 MG (241.3 MG MAGNESIUM) TABLET 400 MG: TABLET at 13:00

## 2024-04-04 ASSESSMENT — PAIN DESCRIPTION - LOCATION
LOCATION: HEAD

## 2024-04-04 ASSESSMENT — PAIN DESCRIPTION - DESCRIPTORS
DESCRIPTORS: ACHING;DULL
DESCRIPTORS: ACHING;DISCOMFORT

## 2024-04-04 ASSESSMENT — PAIN DESCRIPTION - ORIENTATION
ORIENTATION: POSTERIOR
ORIENTATION: LOWER;POSTERIOR

## 2024-04-04 ASSESSMENT — PAIN SCALES - GENERAL
PAINLEVEL_OUTOF10: 2
PAINLEVEL_OUTOF10: 2
PAINLEVEL_OUTOF10: 1

## 2024-04-04 NOTE — CARE COORDINATION
CASE MANAGEMENT POST-PARTUM TRANSITIONAL CARE PLAN    Postpartum state [Z39.2]  Postpartum hypertension [O16.5]    OB Provider: [facility]    Writer met w/ Elva at her bedside to discuss DCP. She was readmitted today for post partum pre-eclampsia and magnesium treatment    Writer verified address/phone number correct on facesheet. She states she lives with her  Harvey and their two children. Elva denied barriers with transportation home, to doctors appointments or with paying for medications upon discharge home.     [facility] Choice insurance correct.    DME: no.   HOME CARE: no    Anticipate DC once magnesium infusion is complete (24 hours) and BP normalizes.      Readmission Risk              Risk of Unplanned Readmission:  0

## 2024-04-04 NOTE — PROGRESS NOTES
HPI:  DELIVERY DATE: 3/16/24  TYPE OF DELIVERY: normal spontaneous vaginal delivery  PROVIDER: Nimo Dwyer CNM  PERINEUM: 2nd degree repaired    Elva was seen for EXTRA postpartum visit for blood pressure check.   Her Male infant is healthy.  She is breast feeding.  Latch improving.   She is not experiencing pain.    She reports her lochia is light.  Her bowels have returned to her normal pattern. Taking Colace.   She is not back to her normal activity pattern.  She feels she is not having difficulty coping.    Elva arrived for her routine postpartum visit yesterday and was noted to have blood pressure 148/98, she did not have hypertensive diagnosis during pregnancy or after delivery. She was asymptomatic of elevation and had normal PreE labs performed stat after her visit yesterday.     Today Elva reports headache on and off (not currently), located to base of neck when present, described as dull and mild. Feels her vision may be slightly blurry but attributed it to not eating breakfast today. See Plan.     OBJECTIVE:   Wt Readings from Last 3 Encounters:   04/04/24 70.3 kg (155 lb)   04/04/24 70.3 kg (155 lb)   04/03/24 70.3 kg (155 lb)       Vitals:    04/04/24 0931 04/04/24 0938   BP: (!) 158/94 (!) 158/98   Weight: 70.3 kg (155 lb)      POCT urine dip: negative for protein     ASSESSMENT/PLAN:    Postpartum hypertension  See elevations above, currently asymptomatic but not sure if she has blurry vision.   PreE labs were normal yesterday (P/C 0.9) and POCT urine dip negative for protein today  Reviewed case with on-call YOSVANY Pelaez who spoke with resident/attending at [facility] and they reviewed eclampsia prevention with magnesium infusion inpatient is reasonable since she is borderline severe range. Reviewed this with Elva and she is agreeable to arrive at [facility] L&D/postpartum for direct admission (Dr. Skinner), magnesium and blood pressure stabilization as indicated. ESTEFANIA Pelaez aware of plan.

## 2024-04-04 NOTE — DISCHARGE SUMMARY
Gyn Discharge Summary  [facility]      Patient Name: Elva Katz  Patient : 1985  Primary Care Physician: Alexandria Evans APRN - CNP  Admit Date: 2024    Principal Diagnosis: postpartum preeclampsia with severe features     Other Diagnosis:   Postpartum state [Z39.2]  Postpartum hypertension [O16.5]  Preeclampsia in postpartum period [O14.95]  Patient Active Problem List   Diagnosis    History of loop electrosurgical excision procedure (LEEP) of cervix affecting pregnancy, antepartum    High-risk pregnancy, multigravida of advanced maternal age, antepartum    GBS bacteriuria    RECEIVED flu shot    Low-lying placenta (RESOLVED)    Tdap vaccination: RECEIVED    Rh+/RI/GBSbacteriuria    Normal labor     3/16/ M Apg 8/9 Wt 8#4    Postpartum PreE with SF    Postpartum state    Preeclampsia in postpartum period       Infection: No  Hospital Acquired: N/A       Consultations: none    Pertinent Findings & Procedures:   Elva Katz is a 38 y.o. female , admitted for postpartum preeclampsia with severe features; received Mag sulfate 4g>2g x24hrs. She was started on Procardia 30 XL daily.  Post-op course normal, discharged home .  Follow up in 1 weeks. Discharge instructions reviewed and questions answered.    Course of patient: Complicated with preeclampsia with severe features    Discharge to: Home    Readmission planned: No    Recommendations on Discharge:     Medications:     Medication List        START taking these medications      NIFEdipine 30 MG extended release tablet  Commonly known as: PROCARDIA XL  Take 1 tablet by mouth daily  Start taking on: 2024            CONTINUE taking these medications      ibuprofen 600 MG tablet  Commonly known as: ADVIL;MOTRIN  Take 1 tablet by mouth every 6 hours as needed for Pain     PRENATAL+DHA PO     senna 8.6 MG tablet  Commonly known as: Senokot  Take 1 tablet by mouth 2 times daily as needed for

## 2024-04-04 NOTE — LACTATION NOTE
Pt concerned with nursing infant at times due to having nipple pain/damage. She reports that her nipples are not damaged today, but she notices her nipples are creased after feeds at breast. Infant has strong sustained suck on writers gloved finger, but does frequently lose suction when gentle traction placed on chin. Assisted mother with deep latch with her permission. Mother was latching shallow. Reviewed chin into breast first, laid back nursing position, and making sure latch is comfortable during the feed. Encouraged pt to relatch if she feels pinching pain at breast versus gentle tugging. Information given to pt on cranial sacral therapy for infant if she wants to pursue and to see if it can improve feeding. Encouraged close lactation follow up visits and also a weighted feed. Pt reports breasts feeling less engorged and not ever full, except sometimes during night feeds. Breast pads given to pt, denies other needs. Encouraged her to call out as needed.

## 2024-04-04 NOTE — PROGRESS NOTES
Resident Interval Magnesium Sulfate Note    Elva Katz is a 38 y.o. female  s/p uncomplicated  3/16/24  The patient is resting comfortably. She denies headache, visual changes, abdominal pain, vaginal bleeding, discharge, and itching, nausea without vomiting, backache, and dysuria. She denies any shortness of breath or chest pain. She denies change in her extremities, regarding swelling.    Continuous Medications:    sodium chloride      lactated ringers IV soln 75 mL/hr at 24 1821    sodium chloride      magnesium sulfate 2,000 mg/hr (24 1431)       Vitals:    Vitals:    24 1700 24 1800 24 1900 24   BP: (!) 155/71 (!) 142/68 139/80 128/68   Pulse: 68 65 69 69   Resp: 16 16 15 16   Temp:    97.7 °F (36.5 °C)   TempSrc:    Oral   SpO2: 99% 99% 96% 97%   Weight:       Height:            Physical Exam:  Chest: clear to auscultation bilaterally  Heart: RRR no murmur  Abdomen: soft, nontender, nondistended  Extremities: DTR normal Bilateral upper and lower extremities Right: 2/4   Left: 2/4  Clonus: absent    Urine Output: Adequate; Yellow urine    Labs:  Last Magnesium Level:   No results found for: \"MG\"    BMP:    Recent Labs     24  2210 24  1325    142   K 4.6 3.6*    108*   CO2 23 23   BUN 16 13   CREATININE 0.8 0.8   GLUCOSE 88 97       ASSESSMENT/PLAN  Elva Katz is a 38 y.o. female      - Continue Magnesium Sulfate Treatment 2g/hr, off @ 1414 on 24   - recheck Mag levels q6hrs per provider   - BPs hypertensive rechecks every one hour    - Patient currently denies any s/s PreE   - UOP adequate    - PreE labs wnl, P/C 0.45   - Currently still hypertensive but improving    - Last IV anti-hypertensive with labetalol 24 @ 1358       Aayush Zimmerman MD  Ob/Gyn Resident  2024, 9:24 PM

## 2024-04-04 NOTE — FLOWSHEET NOTE
Dr Regalado at bedside. Assessment completed. Plan of care discussed.  Mother currently has baby to breast.

## 2024-04-04 NOTE — PROGRESS NOTES
[facility] OBST[facility]YNECOLOGY  [address]   [address]  [facility] [address]  Dept: [phone]   Patient Name: Elva Katz  Patient : 1985  MRN #: [id number]  CSN #: [id number]    [facility] Blood Pressure Nurse Visit    Visit date:  2024     Elva Katz is here for a follow up blood pressure nurse visit.    Elva Katz is Postpartum 19 days     Symptoms of pre-eclampsia       Headache: Yes       Blurry vision:  No       Edema: No       RUQ pain: No       SOB: No      Taking antihypertensives:  No      LMP 2023     @weight@    Patient was added on providers schedule.      If > 140/90 Ann Forbes CNM was notified prior to the patient leaving the office     Does the patient have a 2 week and 6 week postpartum follow up scheduled?     Completed chart forwarded to Ann Forbes cnm  after completing visit.     Charted by VICKIE MUNOZ MA

## 2024-04-04 NOTE — H&P
OB/GYN H&P  [facility]    Patient Name: Elva Katz     Patient : 1985  Room/Bed: 0752/0752-01  Admission Date/Time: 2024 12:29 PM  Primary Care Physician: Alexandria Evans, JEAN - CNP        CC: elevated BP at postpartum visit, headache             HPI: Elva Katz is a 38 y.o. female  presents from midwife office due to newly elevated BP and persistent headache. She is 2 weeks postpartum s/p uncomplicated  on 3/16. Since delivery, she reports mild headache that resolves with postpartum Motrin/Tylenol but always returns. In midwife office, BP newly elevated yesterday and today in 140-150s/90s. PreE labs wnl 4/3. Upon arrival, she reports mild headache but hasn't taken any meds yet.     From post partum standpoint, she denies any complaints. Bleeding is light and she is breastfeeding. Denies any concerns for mastitis at this time.      REVIEW OF SYSTEMS:  Constitutional: negative fever, negative chills  HEENT: negative visual disturbances, + headaches  Respiratory: negative dyspnea, negative cough  Cardiovascular: negative chest pain,  negative palpitations  Gastrointestinal: negative abdominal pain, negative RUQ pain, negative N/V, negative diarrhea, negative constipation  Genitourinary: negative dysuria, negative vaginal discharge  Dermatological: negative rash  Hematologic: negative bruising  Immunologic/Lymphatic: negative recent illness, negative recent sick contact  Musculoskeletal: negative back pain, negative myalgias, negative arthralgias  Neurological:  negative dizziness, negative weakness  Behavior/Psych: negative depression, negative anxiety  ___________________________________________________________________    OBSTETRIC HISTORY:   OB History    Para Term  AB Living   3 2 2 0 1 2   SAB IAB Ectopic Molar Multiple Live Births   1 0 0 0 0 2      # Outcome Date GA Lbr Misbah/2nd Weight Sex Delivery Anes PTL Lv   3 Term 24 40w3d 00:58  / 00:16 3.75 kg (8 lb 4.3 oz) M Vag-Spont None N YASMANY      Name: BRENDON LUO      Apgar1: 8  Apgar5: 9   2 Term 17 39w2d 04:56 / 04:01 2.8 kg (6 lb 2.8 oz) F Vag-Spont EPI N YASMANY      Birth Comments: baby had 2 week NICU- stay for Sepsis- Satph infection in the bloodstream      Name: PUJABABY GIRL MARIA E      Apgar1: 9  Apgar5: 9   1 SAB 2015 6w0d             Birth Comments: no interventions      Obstetric Comments   [id number]-H0-G3       PAST MEDICAL HISTORY:   has a past medical history of Abnormal Pap smear of cervix, Anxiety, HPV in female, Postpartum hypertension, STD (sexually transmitted disease), and  3/16/24 M Apg 8/9 Wt *.    PAST SURGICAL HISTORY:   has a past surgical history that includes hernia repair and LEEP.    ALLERGIES:  Allergies as of 2024    (No Known Allergies)       MEDICATIONS:  Current Facility-Administered Medications   Medication Dose Route Frequency Provider Last Rate Last Admin    sodium chloride flush 0.9 % injection 5-40 mL  5-40 mL IntraVENous 2 times per day Selma Regalado DO        sodium chloride flush 0.9 % injection 5-40 mL  5-40 mL IntraVENous PRN Selma Regalado,    10 mL at 24 1335    0.9 % sodium chloride infusion   IntraVENous PRN Selma Regalado DO        acetaminophen (TYLENOL) tablet 1,000 mg  1,000 mg Oral 3 times per day Selma Regalado DO   1,000 mg at 24 1300    ibuprofen (ADVIL;MOTRIN) tablet 600 mg  600 mg Oral Q6H PRN Selma Regalado DO   600 mg at 24 1300    ondansetron (ZOFRAN) injection 4 mg  4 mg IntraVENous Q4H PRN Selma Regalado DO        simethicone (MYLICON) chewable tablet 80 mg  80 mg Oral Q6H PRN Selma Regalado DO        docusate sodium (COLACE) capsule 100 mg  100 mg Oral BID Selma Regalado DO        magnesium hydroxide (MILK OF MAGNESIA) 400 MG/5ML suspension 30 mL  30 mL Oral Daily PRN Selma Regalado DO        bisacodyl (DULCOLAX) suppository 10 mg  10 mg Rectal Daily PRN Selma Regalado DO

## 2024-04-04 NOTE — CARE COORDINATION
04/04/24 1611   Readmission Assessment   Number of Days since last admission? 8-30 days   Previous Disposition Home with Family   Who is being Interviewed Patient  (chart)   What was the patient's/caregiver's perception as to why they think they needed to return back to the hospital? Other (Comment)  (BP was too high)   Did you visit your Primary Care Physician after you left the hospital, before you returned this time? Yes  ([facility] on 4/3, return 4/4 for BP check and instructed to go to [facility]'s.)   Did you see a specialist, such as Cardiac, Pulmonary, Orthopedic Physician, etc. after you left the hospital? No   Who advised the patient to return to the hospital? Physician's Nurse/Office staff;Other (Comment)  (CNM)   Does the patient report anything that got in the way of taking their medications? No   In our efforts to provide the best possible care to you and others like you, can you think of anything that we could have done to help you after you left the hospital the first time, so that you might not have needed to return so soon? Other (Comment)  (no)     Elva returned for readmission due to unstable hypertension after discharge and Pre-eclampsia.

## 2024-04-04 NOTE — FLOWSHEET NOTE
Patient admitted to room 752, direct admit.  Oriented to room and surroundings.  Plan of care reviewed.  Verbalized understanding.  Call light placed within reach.

## 2024-04-04 NOTE — PLAN OF CARE
Problem: Pain  Goal: Verbalizes/displays adequate comfort level or baseline comfort level  Outcome: Progressing  Flowsheets (Taken 4/4/2024 1319)  Verbalizes/displays adequate comfort level or baseline comfort level:   Encourage patient to monitor pain and request assistance   Assess pain using appropriate pain scale   Administer analgesics based on type and severity of pain and evaluate response   Implement non-pharmacological measures as appropriate and evaluate response   Consider cultural and social influences on pain and pain management   Notify Licensed Independent Practitioner if interventions unsuccessful or patient reports new pain     Problem: Risk for Maternal Injury  Goal: Remains free from seizures and injury related to seizure activity  Description: INTERVENTIONS:.  -Maintain airway, patient safety and administer oxygen as ordered  -Monitor patient for seizure activity, document and report duration and descrition  -If Seizure occurs, turn patient to dide and suction secretions as needed  -Reorient patient post seizure  -Seizure Pads  -Instruct patient/family to notify RN of any seizure activity  -Instruct patient/family to call for assistance with activity based on assessment  Outcome: Progressing     Problem: Risk for Decreased Cardiac Output  Goal: Maintains optimal cardiac output and hemodynamic stability  Description: INTERVENTIONS:.  -Monitor blood pressure and heart rate  -Monitor urine output and notify licensed practitioner for values outside of   -Assess for signes of decreased cardiac output  -Administer fluid and /or volume expanders as ordered    Outcome: Progressing  Goal: Achieves optimal ventilation and oxygenation  Description: INTERVENTIONS:.  -Assess for changes in respiratory status   -Assess for changes in mentation and behavior  -Position to facilitate oxygenation and minimize respiratory effort  -Oxygen supplementation based on oxygen saturation or arterial blood

## 2024-04-05 VITALS
HEIGHT: 62 IN | BODY MASS INDEX: 28.52 KG/M2 | DIASTOLIC BLOOD PRESSURE: 84 MMHG | SYSTOLIC BLOOD PRESSURE: 126 MMHG | TEMPERATURE: 98.3 F | RESPIRATION RATE: 14 BRPM | OXYGEN SATURATION: 95 % | WEIGHT: 155 LBS | HEART RATE: 85 BPM

## 2024-04-05 LAB — MAGNESIUM SERPL-MCNC: 7.3 MG/DL (ref 1.6–2.6)

## 2024-04-05 PROCEDURE — 36415 COLL VENOUS BLD VENIPUNCTURE: CPT

## 2024-04-05 PROCEDURE — 6360000002 HC RX W HCPCS: Performed by: STUDENT IN AN ORGANIZED HEALTH CARE EDUCATION/TRAINING PROGRAM

## 2024-04-05 PROCEDURE — 83735 ASSAY OF MAGNESIUM: CPT

## 2024-04-05 PROCEDURE — 6370000000 HC RX 637 (ALT 250 FOR IP): Performed by: STUDENT IN AN ORGANIZED HEALTH CARE EDUCATION/TRAINING PROGRAM

## 2024-04-05 PROCEDURE — 6360000002 HC RX W HCPCS

## 2024-04-05 PROCEDURE — 2580000003 HC RX 258: Performed by: STUDENT IN AN ORGANIZED HEALTH CARE EDUCATION/TRAINING PROGRAM

## 2024-04-05 RX ORDER — NIFEDIPINE 30 MG/1
30 TABLET, EXTENDED RELEASE ORAL DAILY
Qty: 30 TABLET | Refills: 3 | Status: SHIPPED | OUTPATIENT
Start: 2024-04-06

## 2024-04-05 RX ORDER — DIPHENHYDRAMINE HYDROCHLORIDE 50 MG/ML
25 INJECTION INTRAMUSCULAR; INTRAVENOUS ONCE
Status: COMPLETED | OUTPATIENT
Start: 2024-04-05 | End: 2024-04-05

## 2024-04-05 RX ORDER — NIFEDIPINE 30 MG/1
30 TABLET, EXTENDED RELEASE ORAL DAILY
Qty: 30 TABLET | Refills: 0 | Status: SHIPPED | OUTPATIENT
Start: 2024-04-05

## 2024-04-05 RX ORDER — METOCLOPRAMIDE HYDROCHLORIDE 5 MG/ML
10 INJECTION INTRAMUSCULAR; INTRAVENOUS ONCE
Status: COMPLETED | OUTPATIENT
Start: 2024-04-05 | End: 2024-04-05

## 2024-04-05 RX ADMIN — ACETAMINOPHEN 1000 MG: 500 TABLET ORAL at 12:50

## 2024-04-05 RX ADMIN — DOCUSATE SODIUM 100 MG: 100 CAPSULE, LIQUID FILLED ORAL at 21:09

## 2024-04-05 RX ADMIN — IBUPROFEN 600 MG: 600 TABLET, FILM COATED ORAL at 08:14

## 2024-04-05 RX ADMIN — DIPHENHYDRAMINE HYDROCHLORIDE 25 MG: 50 INJECTION, SOLUTION INTRAMUSCULAR; INTRAVENOUS at 10:18

## 2024-04-05 RX ADMIN — ACETAMINOPHEN 1000 MG: 500 TABLET ORAL at 05:08

## 2024-04-05 RX ADMIN — MAGNESIUM SULFATE HEPTAHYDRATE 2000 MG/HR: 40 INJECTION, SOLUTION INTRAVENOUS at 10:13

## 2024-04-05 RX ADMIN — SODIUM CHLORIDE, PRESERVATIVE FREE 10 ML: 5 INJECTION INTRAVENOUS at 21:09

## 2024-04-05 RX ADMIN — NIFEDIPINE 30 MG: 30 TABLET, FILM COATED, EXTENDED RELEASE ORAL at 08:14

## 2024-04-05 RX ADMIN — METOCLOPRAMIDE 10 MG: 5 INJECTION, SOLUTION INTRAMUSCULAR; INTRAVENOUS at 10:18

## 2024-04-05 RX ADMIN — MAGNESIUM OXIDE 400 MG (241.3 MG MAGNESIUM) TABLET 400 MG: TABLET at 08:14

## 2024-04-05 RX ADMIN — DOCUSATE SODIUM 100 MG: 100 CAPSULE, LIQUID FILLED ORAL at 08:14

## 2024-04-05 RX ADMIN — MAGNESIUM SULFATE HEPTAHYDRATE 2000 MG/HR: 40 INJECTION, SOLUTION INTRAVENOUS at 00:12

## 2024-04-05 RX ADMIN — ACETAMINOPHEN 1000 MG: 500 TABLET ORAL at 21:09

## 2024-04-05 RX ADMIN — SODIUM CHLORIDE, POTASSIUM CHLORIDE, SODIUM LACTATE AND CALCIUM CHLORIDE: 600; 310; 30; 20 INJECTION, SOLUTION INTRAVENOUS at 12:52

## 2024-04-05 RX ADMIN — IBUPROFEN 600 MG: 600 TABLET, FILM COATED ORAL at 15:36

## 2024-04-05 RX ADMIN — IBUPROFEN 600 MG: 600 TABLET, FILM COATED ORAL at 01:09

## 2024-04-05 ASSESSMENT — PAIN SCALES - GENERAL
PAINLEVEL_OUTOF10: 2
PAINLEVEL_OUTOF10: 1
PAINLEVEL_OUTOF10: 1
PAINLEVEL_OUTOF10: 3
PAINLEVEL_OUTOF10: 1
PAINLEVEL_OUTOF10: 1

## 2024-04-05 ASSESSMENT — PAIN DESCRIPTION - LOCATION
LOCATION: HEAD
LOCATION: HEAD
LOCATION: OTHER (COMMENT)
LOCATION: HEAD
LOCATION: HEAD

## 2024-04-05 ASSESSMENT — PAIN DESCRIPTION - DESCRIPTORS: DESCRIPTORS: DULL

## 2024-04-05 NOTE — PROGRESS NOTES
Resident Interval Magnesium Sulfate Note    Elva Katz is a 38 y.o. female  with PP PreE w/ SF  The patient is resting comfortably. She reports a mild headache, denies visual changes, abdominal pain in the right upper quadrant, vaginal bleeding, nausea, and dysuria. She denies any shortness of breath or chest pain. She denies change in her extremities, regarding swelling.    Continuous Medications:    sodium chloride      lactated ringers IV soln 75 mL/hr at 24 1821    sodium chloride      magnesium sulfate 2,000 mg/hr (24 0012)       Vitals:    Vitals:    24 0300 24 0400 24 0500 24 0600   BP: 123/81 (!) 144/82 130/76 129/82   Pulse: 70 80 81 70   Resp: 16 16 17 16   Temp:  97.7 °F (36.5 °C)     TempSrc:  Oral     SpO2: 97% 98% 97% 98%   Weight:       Height:           Physical Exam:  Chest: clear to auscultation bilaterally  Heart: RRR no murmur  Abdomen: soft, nontender, nondistended  Extremities: DTR normal Bilateral lower extremities Right: 2/4   Left: 2/4  Clonus: absent    Urine Output: 150 cc/hr; Clear urine    Labs:  Last Magnesium Level:   No results found for: \"MG\"    BMP:    Recent Labs     24  2210 24  1325    142   K 4.6 3.6*    108*   CO2 23 23   BUN 16 13   CREATININE 0.8 0.8   GLUCOSE 88 97       ASSESSMENT/PLAN  Elva Katz is a 38 y.o. female [id number] PreE w/ SF   - Continue Magnesium Sulfate Treatment 2g/hr, off @ 1410 on 24   - No Mag levels q6hrs per provider   - BPs normotensive   - Patient denies any s/s PreE   - UOP adequate   - PreE labs wnl, P/C 0.45   - Currently controlled on Procardia XL 30mg QD   - Last IV anti-hypertensive @ 1358 24 (Labetalol 20mg IV x1)   - Continue to monitor    Jeronimo Beck MD  Ob/Gyn Resident  2024, 8:14 AM

## 2024-04-05 NOTE — FLOWSHEET NOTE
RN to bedside for assessment and mag check, pt c/o worsening blurred vision and feeling dizzy. RN notifies dr. Beck via phonecall. Plan to check mag level.

## 2024-04-05 NOTE — LACTATION NOTE
Per mother she feels like latching has been going much better since working with lactation yesterday. She can tell when baby is not latched deeply and nipple is then compressed or feed is painful. She states she has been working to modify her technique and positioning and expresses great improvement and feeling better about breastfeeding. Baby is having several urine, 6+ and stool multiple times a day. Encouraged pt to call out as needed for assistance or if she desires a weighted feed.

## 2024-04-05 NOTE — PROGRESS NOTES
Resident Interval Post-partum Magnesium Sulfate Note    Elva Katz is a 38 y.o. female  PPD # 20 postpartum  3/16/24  The patient is resting comfortably. She denies headache, visual changes, and abdominal pain in the right upper quadrant. She denies any shortness of breath or chest pain. She denies change in her extremities, regarding swelling.     Her lochia is light. She denies shortness of breath, headache, and blurred vision. She is  breast feeding and she denies any breast tenderness. She is ambulating well. Her voiding pattern is normal. I reviewed signs and symptoms of post partum depression with the patient, she currently denies any of these symptoms. She is tolerating solids. Patient denies fever, chills, chest pain, shortness of breath, nausea, vomiting, headache, vision changes, or RUQ pain, or calf pain.    Continuous Medications:    sodium chloride      lactated ringers IV soln 75 mL/hr at 24 1821    sodium chloride      magnesium sulfate 2,000 mg/hr (24 0012)       Vitals:    Vitals:    24 0200   BP: 130/69   Pulse: 64   Resp: 16   Temp:    SpO2: 96%         Physical Exam:  General:  awake, alert, cooperative, no apparent distress, and appears stated age  Neurologic:  alert, oriented, normal speech, no focal findings or movement disorder noted  Lungs:  No increased work of breathing, good air exchange, clear to auscultation bilaterally, no crackles or wheezing  Heart:  Normal apical impulse, regular rate and rhythm, normal S1 and S2, no S3 or S4, and no murmur noted    Abdomen: Soft, nontender, nondistended, bowel sounds present, no rebound, rigidity or guarding  Fundus: non-tender, firm, below umbilicus  Extremities:  no calf tenderness, non edematous, DTR normal Right: 2/4   Left: 2/4  Clonus: absent    Urine Output: 353 ml/hr; Clear urine    Labs:  Last Magnesium Level:   No results found for: \"MG\"    BMP:    Recent Labs     24  2210 24  1325     142   K 4.6 3.6*    108*   CO2 23 23   BUN 16 13   CREATININE 0.8 0.8   GLUCOSE 88 97       ASSESSMENT/PLAN  Elva Katz is a  PPD # 20 s/p  3/16/24   - Doing well, VSS  -Continue Magnesium Sulfate Treatment 2g/hr, off @ 1414 on 24   Mag levels q6hrs per provider  - BPs normotensive, continue to monitor  - Patient denies any s/s PreE  - UOP adequate  - PreE labs wnl, P/C .45  - Currently controlled on Procardia 30 XL  - Last IV anti-hypertensive Labetolol 20 mg @ 1358       Provider's Name: Dr. Bess Ballard,   Ob/Gyn Resident   2024, 12:16 AM

## 2024-04-05 NOTE — PROGRESS NOTES
POST PARTUM DAY # 20    Elva Katz is a 38 y.o. female  This patient was seen & examined today.     Her pregnancy was complicated by:   Patient Active Problem List   Diagnosis    History of loop electrosurgical excision procedure (LEEP) of cervix affecting pregnancy, antepartum    High-risk pregnancy, multigravida of advanced maternal age, antepartum    GBS bacteriuria    RECEIVED flu shot    Low-lying placenta (RESOLVED)    Tdap vaccination: RECEIVED    Rh+/RI/GBSbacteriuria    Normal labor     3/16/24 M Apg 8/9 Wt 8#4    Postpartum PreE with SF    Postpartum state       Today she is doing well without any chief complaint. Her lochia is light. She denies chest pain, shortness of breath, headache, and blurred vision. She is  breast feeding and she denies any breast tenderness. She is ambulating well. Her voiding pattern is normal. I reviewed signs and symptoms of post partum depression with the patient, she currently denies any of these symptoms. She is tolerating solids.     Vital Signs:  Vitals:    24 0500   BP: 130/76   Pulse:    Resp:    Temp:    SpO2:         Physical Exam:  General:  no apparent distress, alert, and cooperative  Neurologic:  alert, oriented, normal speech, no focal findings or movement disorder noted  Lungs:  no increased work of breathing  Heart:  regular rate and rhythm    Abdomen: abdomen soft, non-distended, non-tender  Fundus: non-tender, normal size, firm, below umbilicus  Extremities:  no calf tenderness, non edematous    Lab:  Lab Results   Component Value Date    HGB 13.1 2024     Lab Results   Component Value Date    HCT 40.4 2024       Assessment/Plan:  Elva Katz is a  PPD # 20 s/p  3/16/24   - Doing well, VSS   - male infant in General Care Nursery, circumcision done   - Encourage ambulation   - Postpartum Hgb/Hct completed 13.1/40.4    2.   PP PreE w/ SF   -BP intermittently elevated, non severe range  -Continue Magnesium Sulfate

## 2024-04-05 NOTE — PROGRESS NOTES
Resident Interval Magnesium Sulfate Note    Elva Katz is a 38 y.o. female  s/p  3/16/24  The patient is resting comfortably. She denies headache, visual changes, and abdominal pain in the right upper quadrant. She denies any shortness of breath or chest pain. She denies change in her extremities, regarding swelling.    Continuous Medications:    sodium chloride      lactated ringers IV soln 75 mL/hr at 24 1821    sodium chloride      magnesium sulfate 2,000 mg/hr (24 1431)       Vitals:    Vitals:    24 2200   BP: 123/63   Pulse: 67   Resp: 16   Temp:    SpO2: 97%        Physical Exam:  Chest: clear to auscultation bilaterally  Heart: RRR no murmur  Abdomen: soft, nontender, nondistended  Extremities: DTR normal Bilateral lower extremities Right: 2/4   Left: 2/4  Clonus: absent    Urine Output: 65ml/hr; Yellow urine    Labs:  Last Magnesium Level:   No results found for: \"MG\"    BMP:    Recent Labs     24  2210 24  1325    142   K 4.6 3.6*    108*   CO2 23 23   BUN 16 13   CREATININE 0.8 0.8   GLUCOSE 88 97       ASSESSMENT/PLAN  Elva Katz is a 38 y.o. female   s/p  3/16/24  - Continue Magnesium Sulfate Treatment 2g/hr, off @ 1414 on 24   - Mag levels q6hrs per provider   - BPs normotensive, continue to monitor   - Patient denies any s/s PreE   - UOP adequate   - PreE labs wnl, P/C .45   - Currently controlled on Procardia 30 XL   - Last IV anti-hypertensive Labetolol 20 mg @ 1358       Jonathan Mason MD  Ob/Gyn Resident  2024, 11:22 PM

## 2024-04-05 NOTE — FLOWSHEET NOTE
Pt was exhausted at 0600 mag check with HA increasing to a 2/10 and requested to sleep through 0700 check. VS have been WNL so Dr Julian approved.

## 2024-04-05 NOTE — PROGRESS NOTES
Resident Interval Magnesium Sulfate Note    Elva Katz is a 38 y.o. female  with PP PreE w/ SF  The patient is resting comfortably. She reports a mild headache, denies visual changes, abdominal pain in the right upper quadrant, vaginal bleeding, nausea, and dysuria. She denies any shortness of breath or chest pain. She denies change in her extremities, regarding swelling.    Continuous Medications:    sodium chloride      lactated ringers IV soln 75 mL/hr at 24 1821    sodium chloride      magnesium sulfate 2,000 mg/hr (24 1013)       Vitals:    Vitals:    24 0800 24 0900 24 1010 24 1100   BP: 124/83 124/78 123/75 124/76   Pulse: 75 85 75 68   Resp: 16 16 16 16   Temp: 97.5 °F (36.4 °C)      TempSrc: Oral      SpO2: 97% 97% 97%    Weight:       Height:           Physical Exam:  Chest: clear to auscultation bilaterally  Heart: RRR no murmur  Abdomen: soft, nontender, nondistended  Extremities: DTR normal Bilateral lower extremities Right: 2/4   Left: 2/4  Clonus: absent    Urine Output: 200 cc/hr; Clear urine    Labs:  Last Magnesium Level:   Lab Results   Component Value Date/Time    MG 7.3 2024 09:56 AM       BMP:    Recent Labs     24  2210 24  1325    142   K 4.6 3.6*    108*   CO2 23 23   BUN 16 13   CREATININE 0.8 0.8   GLUCOSE 88 97         ASSESSMENT/PLAN  Elva Katz is a 38 y.o. female [id number] PreE w/ SF   - Continue Magnesium Sulfate Treatment 2g/hr, off @ 1410 on 24   - No Mag levels q6hrs per provider   - BPs normotensive   - Patient denies any s/s PreE   - UOP adequate   - PreE labs wnl, P/C 0.45   - Currently controlled on Procardia XL 30mg QD   - Last IV anti-hypertensive @ 1358 24 (Labetalol 20mg IV x1)   - Continue to monitor    Jeronimo Beck MD  Ob/Gyn Resident  2024, 11:47 AM

## 2024-04-05 NOTE — FLOWSHEET NOTE
Patient states her headache has completely resolved and her vision is back to normal. YOSVANY Peraza notified via perfect serve

## 2024-04-06 NOTE — FLOWSHEET NOTE
Writer sat with pt and went over discharge paperwork and education. Pt knows that she needs to schedule a follow up appointment with her OB for a blood pressure check. Pt aware of the medications that she needs to be taking and how to take them. IV removed. Enough supplies given to patient for a couple days.Pt given blood pressure cuff and log. Pt doesn't have any questions or concerns at this time. Pt is walked to car by a staff member.

## 2024-04-08 NOTE — PROGRESS NOTES
CLINICAL PHARMACY NOTE: MEDS TO BEDS    Total # of Prescriptions Filled: 0   The following medications were delivered to the patient:  See notes    Additional Documentation: patient was discharged with out medication, I called patient to let her know she has a medication ready . Left her a voice mail to call us.

## 2024-04-11 ENCOUNTER — NURSE ONLY (OUTPATIENT)
Dept: OBGYN CLINIC | Age: 39
End: 2024-04-11

## 2024-04-11 VITALS — SYSTOLIC BLOOD PRESSURE: 133 MMHG | BODY MASS INDEX: 28.63 KG/M2 | WEIGHT: 154 LBS | DIASTOLIC BLOOD PRESSURE: 83 MMHG

## 2024-04-12 NOTE — PROGRESS NOTES
CNM reviewed BP long. Some elevations of 140/80s. Encouraged compliance with procardia. Pt had not taken in the last 2 days. Pt was already treated for pre-eclapsia.

## 2024-04-18 ENCOUNTER — NURSE ONLY (OUTPATIENT)
Dept: OBGYN CLINIC | Age: 39
End: 2024-04-18

## 2024-04-18 VITALS — SYSTOLIC BLOOD PRESSURE: 111 MMHG | DIASTOLIC BLOOD PRESSURE: 69 MMHG

## 2024-04-18 NOTE — PROGRESS NOTES
[facility] OBSTETRICS AND GYNECOLOGY  [address]  [facility] [address]  Dept: [phone]   Patient Name: Elva Katz  Patient : 1985  MRN #: [id number]  CSN #: [id number]    [place] [facility] Blood Pressure Nurse Visit    Visit date:  2024     Elva Katz is here for a follow up blood pressure nurse visit.    Elva Katz is ____ pregnant or ____ postpartum 5 weeks     Symptoms of pre-eclampsia       Headache: Yes        Blurry vision:  No       Edema: No       RUQ pain: No       SOB: No      Taking antihypertensives:  Yes       /69   LMP 2023   Breastfeeding Yes     @weight@     If > 140/90  CNM was notified prior to the patient leaving the office     Does the patient have a 2 week and 6 week postpartum follow up scheduled? Yes      Completed chart forwarded to Ann Forbes after completing visit.     Charted by Karen Perez MA

## 2024-04-18 NOTE — PROGRESS NOTES
Note reviewed, precautions discussed. Headaches are improving with ibuprofen, warnings signs reinforced. RTO in 1 week as scheduled.

## 2024-04-24 PROBLEM — Z37.9 NORMAL LABOR: Status: RESOLVED | Noted: 2024-03-16 | Resolved: 2024-04-24

## 2024-04-24 PROBLEM — R82.71 GBS BACTERIURIA: Status: RESOLVED | Noted: 2023-10-23 | Resolved: 2024-04-24

## 2024-04-24 PROBLEM — O09.529 HIGH-RISK PREGNANCY, MULTIGRAVIDA OF ADVANCED MATERNAL AGE, ANTEPARTUM: Status: RESOLVED | Noted: 2023-08-28 | Resolved: 2024-04-24

## 2024-04-24 PROBLEM — O44.40 LOW-LYING PLACENTA: Status: RESOLVED | Noted: 2023-11-27 | Resolved: 2024-04-24

## 2024-04-24 PROBLEM — Z23 NEED FOR TDAP VACCINATION: Status: RESOLVED | Noted: 2024-02-05 | Resolved: 2024-04-24

## 2024-04-24 PROBLEM — Z3A.40 40 WEEKS GESTATION OF PREGNANCY: Status: RESOLVED | Noted: 2024-03-16 | Resolved: 2024-04-24

## 2024-04-25 ENCOUNTER — POSTPARTUM VISIT (OUTPATIENT)
Dept: OBGYN CLINIC | Age: 39
End: 2024-04-25

## 2024-04-25 VITALS
WEIGHT: 148.5 LBS | HEIGHT: 62 IN | DIASTOLIC BLOOD PRESSURE: 70 MMHG | BODY MASS INDEX: 27.33 KG/M2 | HEART RATE: 68 BPM | SYSTOLIC BLOOD PRESSURE: 116 MMHG

## 2024-04-25 PROCEDURE — 0503F POSTPARTUM CARE VISIT: CPT | Performed by: ADVANCED PRACTICE MIDWIFE

## 2024-04-25 NOTE — PROGRESS NOTES
Patient is here for 6 week pp check, vaginal delivery on 3/16/24.   Pt has no concerns for today's visit     EPDS given to patient.     Chaperone for Intimate Exam  Chaperone was offered as part of the rooming process. Patient declined and agrees to continue with exam without a chaperone.  Chaperone:

## 2024-04-25 NOTE — PROGRESS NOTES
Patient Name: Elva Katz  Patient : 1985  MRN #: [id number]  [facility] #: [id number]  [facility] OBSTETRICS AND GYNECOLOGY  [address]  [place] [address]  Dept: [phone]   Date: 2024  Time: 10:17 AM      Chief Complaint   Patient presents with    Postpartum Care     6 week pp check        Subjective  HPI:  DELIVERY DATE: 3/16/24  TYPE OF DELIVERY: normal spontaneous vaginal delivery  PROVIDER: ALEX Dwyer CNM  PERINEUM: 2nd degree    Elva was seen for her 6 week visit. She was admitted 3 weeks pp  Her Male infant is healthy.  She is breast feeding.  She is breastfeeding without difficulty.  She is not experiencing pain.  She reports her lochia is no bleeding  She reports none perineal discomfort.  She denies urinary incontinence.  Her bowels have returned to her normal pattern.  She is back to her normal activity pattern.  She has not her first menses.   She does have good support at home.   She has been sleeping well.   Elva has not engaged in intercourse.    She does report a mood disorder.    She feels she is not having difficulty coping.  Elva feels her family adjustment is effective.  She reports an overall positive birth experience.  Her O'Brien Score is 0.  This score does match my assessment.      1/15/2024    10:22 AM 3/14/2023    10:00 AM 3/6/2020    11:29 AM   PHQ Scores   PHQ2 Score 0 0 0   PHQ9 Score 0 0 0     Interpretation of Total Score Depression Severity: 1-4 = Minimal depression, 5-9 = Mild depression, 10-14 = Moderate depression, 15-19 = Moderately severe depression, 20-27 = Severe depression      2023    10:00 AM 3/14/2023     9:00 AM   NICOLE 7 SCORE   NICOLE-7 Total Score 1 0     Interpretation of NICOLE-7 score: 5-9 = mild anxiety, 10-14 = moderate anxiety, 15+ = severe anxiety. Recommend referral to behavioral health for scores 10 or greater.       Postpartum Depression: Low

## 2024-11-07 ENCOUNTER — HOSPITAL ENCOUNTER (OUTPATIENT)
Age: 39
Setting detail: SPECIMEN
Discharge: HOME | End: 2024-11-07

## 2024-11-07 ENCOUNTER — OFFICE VISIT (OUTPATIENT)
Dept: OBGYN CLINIC | Age: 39
End: 2024-11-07

## 2024-11-07 VITALS
SYSTOLIC BLOOD PRESSURE: 116 MMHG | WEIGHT: 136.4 LBS | BODY MASS INDEX: 25.1 KG/M2 | DIASTOLIC BLOOD PRESSURE: 78 MMHG | HEIGHT: 62 IN

## 2024-11-07 DIAGNOSIS — Z01.419 WOMEN'S ANNUAL ROUTINE GYNECOLOGICAL EXAMINATION: ICD-10-CM

## 2024-11-07 DIAGNOSIS — Z12.4 CERVICAL CANCER SCREENING: Primary | ICD-10-CM

## 2024-11-07 SDOH — ECONOMIC STABILITY: FOOD INSECURITY: WITHIN THE PAST 12 MONTHS, THE FOOD YOU BOUGHT JUST DIDN'T LAST AND YOU DIDN'T HAVE MONEY TO GET MORE.: NEVER TRUE

## 2024-11-07 SDOH — ECONOMIC STABILITY: INCOME INSECURITY: HOW HARD IS IT FOR YOU TO PAY FOR THE VERY BASICS LIKE FOOD, HOUSING, MEDICAL CARE, AND HEATING?: NOT HARD AT ALL

## 2024-11-07 SDOH — ECONOMIC STABILITY: FOOD INSECURITY: WITHIN THE PAST 12 MONTHS, YOU WORRIED THAT YOUR FOOD WOULD RUN OUT BEFORE YOU GOT MONEY TO BUY MORE.: NEVER TRUE

## 2024-11-07 ASSESSMENT — PATIENT HEALTH QUESTIONNAIRE - PHQ9
2. FEELING DOWN, DEPRESSED OR HOPELESS: NOT AT ALL
1. LITTLE INTEREST OR PLEASURE IN DOING THINGS: NOT AT ALL
SUM OF ALL RESPONSES TO PHQ QUESTIONS 1-9: 0
SUM OF ALL RESPONSES TO PHQ QUESTIONS 1-9: 0
SUM OF ALL RESPONSES TO PHQ9 QUESTIONS 1 & 2: 0
SUM OF ALL RESPONSES TO PHQ QUESTIONS 1-9: 0
SUM OF ALL RESPONSES TO PHQ QUESTIONS 1-9: 0

## 2024-11-07 ASSESSMENT — ANXIETY QUESTIONNAIRES
2. NOT BEING ABLE TO STOP OR CONTROL WORRYING: NOT AT ALL
4. TROUBLE RELAXING: NOT AT ALL
7. FEELING AFRAID AS IF SOMETHING AWFUL MIGHT HAPPEN: NOT AT ALL
6. BECOMING EASILY ANNOYED OR IRRITABLE: NOT AT ALL
1. FEELING NERVOUS, ANXIOUS, OR ON EDGE: NOT AT ALL
GAD7 TOTAL SCORE: 0
5. BEING SO RESTLESS THAT IT IS HARD TO SIT STILL: NOT AT ALL
3. WORRYING TOO MUCH ABOUT DIFFERENT THINGS: NOT AT ALL

## 2024-11-07 NOTE — PROGRESS NOTES
[facility] OBSTETRICS AND GYNECOLOGY  [address]  [facility] [address]  Dept: [phone]    Patient Name: Elva Katz  Patient Age: 39 y.o.  Date of Visit: 11/7/2024    Subjective  Chief Complaint   Patient presents with    Annual Exam     HPI:  Elva Katz is a 39 y.o. female who arrives to office as an established patient for annual exam.    Patient denies concerns today.  Patient reports is not sexually active.  Patient denies pain with sex.  Patient denies a history of sexually transmitted infection(s).  Patient does not want screening for sexually transmitted infection(s).  Reports is not on contraception.     Review of Systems   All other systems reviewed and are negative.      Preventive Health Screening:   Date of last pap: normal 2019  History of abnormal pap: denies          HPV typing/date: negative 2019  Date of last mammogram: N/A   Date of last DEXA scan: N/A   Date of last colonoscopy (start age 45): N/A    History of Gestational Diabetes: No     If Yes, Glucose screening ordered: N/A    Preventive screening: Yes, with PCP    Family history of Breast, Ovarian, Colon or Uterine Cancer:  No      If Yes see scanned worksheet        11/7/2024     2:04 PM 1/15/2024    10:22 AM 3/14/2023    10:00 AM 3/6/2020    11:29 AM   PHQ Scores   PHQ2 Score 0 0 0 0   PHQ9 Score 0 0 0 0     Interpretation of Total Score Depression Severity: 1-4 = Minimal depression, 5-9 = Mild depression, 10-14 = Moderate depression, 15-19 = Moderately severe depression, 20-27 = Severe depression      11/7/2024     2:04 PM 8/28/2023    10:00 AM 3/14/2023     9:00 AM   NICOLE 7 SCORE   NICOLE-7 Total Score 0 1 0     Interpretation of NICOLE-7 score: 5-9 = mild anxiety, 10-14 = moderate anxiety, 15+ = severe anxiety. Recommend referral to behavioral health for scores 10 or greater.     Social Determinants of Health:   Social History     Tobacco

## 2024-11-11 LAB
HPV I/H RISK 4 DNA CVX QL NAA+PROBE: NOT DETECTED
HPV SAMPLE: NORMAL
HPV, INTERPRETATION: NORMAL
HPV16 DNA CVX QL NAA+PROBE: NOT DETECTED
HPV18 DNA CVX QL NAA+PROBE: NOT DETECTED
SPECIMEN DESCRIPTION: NORMAL

## 2024-11-20 LAB — CYTOLOGY REPORT: NORMAL

## 2025-04-02 ASSESSMENT — PATIENT HEALTH QUESTIONNAIRE - PHQ9
1. LITTLE INTEREST OR PLEASURE IN DOING THINGS: NOT AT ALL
SUM OF ALL RESPONSES TO PHQ9 QUESTIONS 1 & 2: 0
2. FEELING DOWN, DEPRESSED OR HOPELESS: NOT AT ALL
SUM OF ALL RESPONSES TO PHQ QUESTIONS 1-9: 0
2. FEELING DOWN, DEPRESSED OR HOPELESS: NOT AT ALL
SUM OF ALL RESPONSES TO PHQ QUESTIONS 1-9: 0
1. LITTLE INTEREST OR PLEASURE IN DOING THINGS: NOT AT ALL

## 2025-04-03 ENCOUNTER — OFFICE VISIT (OUTPATIENT)
Dept: PRIMARY CARE CLINIC | Age: 40
End: 2025-04-03
Payer: COMMERCIAL

## 2025-04-03 VITALS
BODY MASS INDEX: 26.62 KG/M2 | HEART RATE: 61 BPM | RESPIRATION RATE: 15 BRPM | WEIGHT: 141 LBS | OXYGEN SATURATION: 98 % | HEIGHT: 61 IN | SYSTOLIC BLOOD PRESSURE: 112 MMHG | DIASTOLIC BLOOD PRESSURE: 74 MMHG

## 2025-04-03 DIAGNOSIS — Z00.00 ANNUAL PHYSICAL EXAM: Primary | ICD-10-CM

## 2025-04-03 DIAGNOSIS — Z13.29 SCREENING FOR THYROID DISORDER: ICD-10-CM

## 2025-04-03 DIAGNOSIS — Z13.6 SCREENING FOR CARDIOVASCULAR CONDITION: ICD-10-CM

## 2025-04-03 DIAGNOSIS — L29.9 ITCHING: ICD-10-CM

## 2025-04-03 DIAGNOSIS — Z13.1 SCREENING FOR DIABETES MELLITUS (DM): ICD-10-CM

## 2025-04-03 DIAGNOSIS — L65.9 HAIR LOSS: ICD-10-CM

## 2025-04-03 DIAGNOSIS — N89.8 VAGINAL ITCHING: ICD-10-CM

## 2025-04-03 DIAGNOSIS — Z00.00 ENCOUNTER FOR WELL ADULT EXAM WITHOUT ABNORMAL FINDINGS: ICD-10-CM

## 2025-04-03 DIAGNOSIS — Z13.0 SCREENING FOR DEFICIENCY ANEMIA: ICD-10-CM

## 2025-04-03 PROCEDURE — 99395 PREV VISIT EST AGE 18-39: CPT | Performed by: NURSE PRACTITIONER

## 2025-04-03 SDOH — ECONOMIC STABILITY: FOOD INSECURITY: WITHIN THE PAST 12 MONTHS, YOU WORRIED THAT YOUR FOOD WOULD RUN OUT BEFORE YOU GOT MONEY TO BUY MORE.: NEVER TRUE

## 2025-04-03 SDOH — ECONOMIC STABILITY: FOOD INSECURITY: WITHIN THE PAST 12 MONTHS, THE FOOD YOU BOUGHT JUST DIDN'T LAST AND YOU DIDN'T HAVE MONEY TO GET MORE.: NEVER TRUE

## 2025-04-03 ASSESSMENT — ENCOUNTER SYMPTOMS
VOMITING: 0
DIARRHEA: 0
ABDOMINAL PAIN: 0
RHINORRHEA: 0
CHEST TIGHTNESS: 0
NAUSEA: 0
COLOR CHANGE: 0
SORE THROAT: 0
SHORTNESS OF BREATH: 0

## 2025-04-03 NOTE — PROGRESS NOTES
abdominal pain, diarrhea, nausea and vomiting.   Endocrine: Negative for polydipsia.   Genitourinary:  Negative for difficulty urinating.        Vaginal itching after menses   Musculoskeletal:  Negative for arthralgias and myalgias.   Skin:  Negative for color change.   Neurological:  Positive for dizziness and light-headedness. Negative for weakness and headaches.   Psychiatric/Behavioral:  Negative for behavioral problems. The patient is not nervous/anxious.    All other systems reviewed and are negative.         No Known Allergies  Prior to Visit Medications    Not on File     Past Medical History:   Diagnosis Date    Abnormal Pap smear of cervix     HPV    Anxiety 2017    HPV in female     Postpartum hypertension 2024    Preeclampsia in postpartum period 2024    STD (sexually transmitted disease)     HPV     3/16/24 M Apg  Wt * 3/16/2024     Past Surgical History:   Procedure Laterality Date    HERNIA REPAIR      89- groin    LEEP      2004     Family History   Problem Relation Age of Onset    Other Paternal Grandfather         emphasemia    Other Maternal Grandmother         bipolar    Cancer Mother         Pancreatic Cancer    Cancer Other 60        lung great MGM     Social History     Tobacco Use    Smoking status: Never    Smokeless tobacco: Never   Vaping Use    Vaping status: Never Used   Substance Use Topics    Alcohol use: Not Currently     Comment: social     Drug use: No        Objective    Vital Signs  /74   Pulse 61   Resp 15   Ht 1.549 m (5' 1\")   Wt 64 kg (141 lb)   LMP 2025   SpO2 98%   Breastfeeding No   BMI 26.64 kg/m²     Wt Readings from Last 3 Encounters:   25 64 kg (141 lb)   24 61.9 kg (136 lb 6.4 oz)   24 67.4 kg (148 lb 8 oz)       Physical Exam  Respiratory: Clear to auscultation, no wheezing, rales or rhonchi  Skin: Small amount of scaling on the shoulder blade        Personalized Preventive Plan  Current

## 2025-04-07 ENCOUNTER — HOSPITAL ENCOUNTER (OUTPATIENT)
Age: 40
Setting detail: SPECIMEN
Discharge: HOME | End: 2025-04-07

## 2025-04-07 DIAGNOSIS — Z13.0 SCREENING FOR DEFICIENCY ANEMIA: ICD-10-CM

## 2025-04-07 DIAGNOSIS — Z00.00 ANNUAL PHYSICAL EXAM: ICD-10-CM

## 2025-04-07 DIAGNOSIS — Z13.6 SCREENING FOR CARDIOVASCULAR CONDITION: ICD-10-CM

## 2025-04-07 DIAGNOSIS — Z13.29 SCREENING FOR THYROID DISORDER: ICD-10-CM

## 2025-04-07 DIAGNOSIS — Z13.1 SCREENING FOR DIABETES MELLITUS (DM): ICD-10-CM

## 2025-04-07 LAB
ALBUMIN SERPL-MCNC: 4.4 G/DL (ref 3.5–5.2)
ALBUMIN/GLOB SERPL: 1.8 {RATIO} (ref 1–2.5)
ALP SERPL-CCNC: 78 U/L (ref 35–104)
ALT SERPL-CCNC: 13 U/L (ref 10–35)
ANION GAP SERPL CALCULATED.3IONS-SCNC: 9 MMOL/L (ref 9–16)
AST SERPL-CCNC: 16 U/L (ref 10–35)
BASOPHILS # BLD: 0.05 K/UL (ref 0–0.2)
BASOPHILS NFR BLD: 1 % (ref 0–2)
BILIRUB SERPL-MCNC: 0.4 MG/DL (ref 0–1.2)
BUN SERPL-MCNC: 20 MG/DL (ref 6–20)
CALCIUM SERPL-MCNC: 9.2 MG/DL (ref 8.6–10.4)
CHLORIDE SERPL-SCNC: 104 MMOL/L (ref 98–107)
CHOLEST SERPL-MCNC: 190 MG/DL (ref 0–199)
CHOLESTEROL/HDL RATIO: 3
CO2 SERPL-SCNC: 27 MMOL/L (ref 20–31)
CREAT SERPL-MCNC: 0.8 MG/DL (ref 0.6–0.9)
EOSINOPHIL # BLD: 0.28 K/UL (ref 0–0.44)
EOSINOPHILS RELATIVE PERCENT: 7 % (ref 1–4)
ERYTHROCYTE [DISTWIDTH] IN BLOOD BY AUTOMATED COUNT: 12.3 % (ref 11.8–14.4)
GFR, ESTIMATED: >90 ML/MIN/1.73M2
GLUCOSE P FAST SERPL-MCNC: 76 MG/DL (ref 74–99)
HCT VFR BLD AUTO: 39.5 % (ref 36.3–47.1)
HDLC SERPL-MCNC: 63 MG/DL
HGB BLD-MCNC: 12.6 G/DL (ref 11.9–15.1)
IMM GRANULOCYTES # BLD AUTO: <0.03 K/UL (ref 0–0.3)
IMM GRANULOCYTES NFR BLD: 0 %
LDLC SERPL CALC-MCNC: 115 MG/DL (ref 0–100)
LYMPHOCYTES NFR BLD: 1.39 K/UL (ref 1.1–3.7)
LYMPHOCYTES RELATIVE PERCENT: 32 % (ref 24–43)
MCH RBC QN AUTO: 29.8 PG (ref 25.2–33.5)
MCHC RBC AUTO-ENTMCNC: 31.9 G/DL (ref 28.4–34.8)
MCV RBC AUTO: 93.4 FL (ref 82.6–102.9)
MONOCYTES NFR BLD: 0.37 K/UL (ref 0.1–1.2)
MONOCYTES NFR BLD: 9 % (ref 3–12)
NEUTROPHILS NFR BLD: 51 % (ref 36–65)
NEUTS SEG NFR BLD: 2.21 K/UL (ref 1.5–8.1)
NRBC BLD-RTO: 0 PER 100 WBC
PLATELET # BLD AUTO: 314 K/UL (ref 138–453)
PMV BLD AUTO: 9.7 FL (ref 8.1–13.5)
POTASSIUM SERPL-SCNC: 4.3 MMOL/L (ref 3.7–5.3)
PROT SERPL-MCNC: 6.9 G/DL (ref 6.6–8.7)
RBC # BLD AUTO: 4.23 M/UL (ref 3.95–5.11)
SODIUM SERPL-SCNC: 140 MMOL/L (ref 136–145)
TRIGL SERPL-MCNC: 60 MG/DL
TSH SERPL DL<=0.05 MIU/L-ACNC: 0.92 UIU/ML (ref 0.27–4.2)
VLDLC SERPL CALC-MCNC: 12 MG/DL (ref 1–30)
WBC OTHER # BLD: 4.3 K/UL (ref 3.5–11.3)

## 2025-04-10 ENCOUNTER — RESULTS FOLLOW-UP (OUTPATIENT)
Dept: PRIMARY CARE CLINIC | Age: 40
End: 2025-04-10